# Patient Record
Sex: MALE | Race: WHITE | NOT HISPANIC OR LATINO | Employment: UNEMPLOYED | URBAN - METROPOLITAN AREA
[De-identification: names, ages, dates, MRNs, and addresses within clinical notes are randomized per-mention and may not be internally consistent; named-entity substitution may affect disease eponyms.]

---

## 2018-05-17 VITALS
HEART RATE: 86 BPM | WEIGHT: 238 LBS | BODY MASS INDEX: 38.25 KG/M2 | DIASTOLIC BLOOD PRESSURE: 79 MMHG | HEIGHT: 66 IN | SYSTOLIC BLOOD PRESSURE: 116 MMHG

## 2018-05-17 DIAGNOSIS — G57.32 PERONEAL NEUROPATHY, LEFT: ICD-10-CM

## 2018-05-17 DIAGNOSIS — M54.17 LUMBOSACRAL RADICULOPATHY AT L5: Primary | ICD-10-CM

## 2018-05-17 PROCEDURE — 99204 OFFICE O/P NEW MOD 45 MIN: CPT | Performed by: ORTHOPAEDIC SURGERY

## 2018-05-17 RX ORDER — NORTRIPTYLINE HYDROCHLORIDE 10 MG/1
10 CAPSULE ORAL
COMMUNITY

## 2018-05-17 RX ORDER — DICLOFENAC 35 MG/1
CAPSULE ORAL
Refills: 2 | COMMUNITY
Start: 2018-04-03

## 2018-05-17 RX ORDER — LORATADINE 10 MG/1
CAPSULE, LIQUID FILLED ORAL
COMMUNITY

## 2018-05-17 RX ORDER — GABAPENTIN 300 MG/1
300 CAPSULE ORAL
COMMUNITY

## 2018-05-17 NOTE — PROGRESS NOTES
Assessment:     1  Lumbosacral radiculopathy at L5    2  Peroneal neuropathy, left          Plan:     Problem List Items Addressed This Visit        Nervous and Auditory    Lumbosacral radiculopathy at L5 - Primary     48-year-old male with a herniated L5 disc and symptoms and a EMGs confirming L5 radiculopathy  After reviewing his chart, evaluating him, I think that this is a large portion of his underlying symptoms  I do think it is contributing to a double crush pattern with his peroneal nerve  He has a lot of tightness in his low back and lower extremities and at this point the most important thing is for him to start some physical therapy to work on strengthening, stretching, and low back exercises  I also think it would be worth him considering possible surgical decompression based on what his spine surgeon would recommend  I have referred him to Dr Ralf Little to discuss the possibility as to whether or not this would help him given the fact that his injections have failed to give him any long-term relief  I do think that his gait abnormality is contributing to how severe his back problems are  In my opinion feel that the thing that needs to be addressed the most at this time has the best chance of giving him some long-term relief is addressing his low back and radiculopathy  Relevant Orders    Ambulatory referral to Physical Therapy    Ambulatory referral to Orthopedic Surgery    Peroneal neuropathy, left     48-year-old male peroneal neuropathy on his left leg who is status post decompression and multiple injections  Based on his most recent EMG findings that do not think that a 2nd decompression would give him significant relief at this time  I do think that 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 and his peroneal nerve  Patient ID: Karina Barry is a 51 y o  male      Chief Complaint:  Left lower extremity pain with burning and numbness and tingling and weakness    HPI:  16-year-old male with significant history, multiple surgeries and injuries resulting in fairly severe chronic pain in his left lower extremity  He states that his visit here today is two fold  The 1st as he would like to have a new set of eyes to see what can be done for his pain and 2nd is also to help provide defense for his workmen's comp case  Patient had history of an ACL tear which was treated with an ACL reconstruction in 2000  In 2012 he underwent a knee arthroscopy for what was felt to be a medial meniscus tear, but this showed a negative findings of a meniscal tear intraoperatively, but he was told that he had a loose ACL and needed to have another ACL reconstruction  He when later to have the repeat ACL reconstruction, but at the time of surgery it was felt that it was not necessary  He had a lot of problems with his peroneal nerve following this in in November 2013 had a peroneal nerve decompression  Initially following the peroneal nerve decompression he had improvement in symptoms, but then he began having increasing pain and weakness in that leg  His main complaint today is burning in the lateral aspect of his leg starting from his knee distally going down into the foot  He also has lot of pain on the plantar aspect of his foot and has no neuromas which are being followed by a podiatrist   He notes a progressive foot drop and weakness in his left lower extremity  He has had multiple injections around his peroneal nerve to help with tight tissue, the most recent was in February of 2018  These injections were done under ultrasound per the patient  Most recent one did not give him any relief and he found that his symptoms of the foot drop in the weakness worsened following the injection  This had not happened before  He also notes that with repetitive activity he gets significant progressive weakness  He has had multiple EMG studies    He has seen multiple neurosurgeons, podiatrists, and sports medicine doctors  He also complains of a lot of low-back pain  He notes that he has a fairly abnormal gait  He has been working with his sports medical doctor, Elizabeth Bañuelos, to work on his gait  This has been the person who was been dealing with his peroneal nerve with the injections the most     I reviewed medical records and case summaries from Dr Lawyer Jiménez, EMG results from 2014 as well as 2017, progress notes from Dr Linette Oquendo MD, neurosurgery, treatment notes from Seamus Arthur podiatrist,, letters from his  as well as work and comp paperwork, his independent medical evaluation and notes from Dr Katerine Valencia, podiatrist   Patient's past medical history and surgical history were reviewed  Patient also submitted the patient a pictoral guide of his pain and symptoms which was reviewed  Allergy:  Allergies   Allergen Reactions    Latex        Medications:  all current active meds have been reviewed    Past Medical History:  History reviewed  No pertinent past medical history  Past Surgical History:  Past Surgical History:   Procedure Laterality Date    KNEE SURGERY Left     acl    KNEE SURGERY Left     arthroscopy    NEUROMA EXCISION         Family History:  Family History   Problem Relation Age of Onset    No Known Problems Mother     No Known Problems Father        Social History:  History   Alcohol Use    Yes     History   Drug use: Unknown     History   Smoking Status    Never Smoker   Smokeless Tobacco    Never Used           ROS:  Review of Systems   Constitutional: Positive for unexpected weight change  HENT: Negative  Eyes: Negative  Respiratory: Negative  Cardiovascular: Negative  Gastrointestinal: Negative  Endocrine: Negative  Genitourinary: Negative  Musculoskeletal: Positive for arthralgias and myalgias  Skin: Negative  Allergic/Immunologic: Negative  Neurological: Positive for numbness  Hematological: Negative  Psychiatric/Behavioral: Negative  Objective:  BP Readings from Last 1 Encounters:   05/17/18 116/79      Wt Readings from Last 1 Encounters:   05/17/18 108 kg (238 lb)        BMI:   Estimated body mass index is 38 41 kg/m² as calculated from the following:    Height as of this encounter: 5' 6" (1 676 m)  Weight as of this encounter: 108 kg (238 lb)  EXAM:   Physical Exam   Constitutional: He is oriented to person, place, and time  He appears well-developed and well-nourished  No distress  HENT:   Head: Normocephalic and atraumatic  Eyes: Right eye exhibits no discharge  Left eye exhibits no discharge  Neck: Normal range of motion  Neck supple  No tracheal deviation present  Cardiovascular: Normal rate and regular rhythm  Pulmonary/Chest: Effort normal  No respiratory distress  Abdominal: Soft  He exhibits no distension  There is no tenderness  Musculoskeletal:        Left knee: He exhibits no effusion  Patient has excellent muscle mass noted throughout  There is no atrophy noted of his lower extremities  He walks with a very guarded gait on his left with a short swelling and no follow through on the left  He is able to heel and toe walk, but this causes significant amount of low back pain as well as pain in the ball of his foot of his left foot when he is toe walking  He has significant difficulty doing a tandem walk both with some muscle control and balance  Neurological: He is alert and oriented to person, place, and time  Skin: Skin is warm  He is not diaphoretic  No erythema  Psychiatric: He has a normal mood and affect  His behavior is normal      Left Knee Exam     Tenderness   Left knee tenderness location: Healing scar at the fibular neck with tenderness in the area and a positive Tinel's  Range of Motion   The patient has normal left knee ROM      Tests   Cassandra:  Medial - negative Lateral - negative  Lachman:  Anterior - negative Drawer:       Posterior - negative  Varus: negative  Valgus: negative  Pivot Shift: negative  Patellar Apprehension: negative    Other   Erythema: absent  Sensation: normal  Pulse: present  Swelling: none  Effusion: no effusion present      Back Exam     Tenderness   The patient is experiencing tenderness in the lumbar (No tenderness in the paraspinal muscles, point tenderness in the low lumbar spine midline)  Range of Motion   Extension: normal   Flexion: abnormal   Lateral Bend Right: normal Back lateral bend right: Right lateral been creates more pain in the back than left lateral bend  Lateral Bend Left: normal   Rotation Right: normal Back rotation right: More pain with rotation to the right than to the left  Rotation Left: normal     Muscle Strength   The patient has normal back strength  Tests   Straight leg raise right: negative  Straight leg raise left: positive    Reflexes   Patellar: 1/4  Achilles: 1/4  Babinski's sign: normal     Other   Toe Walk: abnormal  Heel Walk: abnormal  Sensation: normal  Gait: abnormal   Erythema: no back redness    Comments:  No pain with hip range of motion, patient has tight hamstrings noted with pain goes and in the back of the knee as well as in the back with straight leg raise              Radiographs:  I have personally reviewed pertinent films in PACS and my interpretation is L5 disc herniation on MRI  I spent 38 min with the patient today  Greater than 50% of my time was spent in counseling and discussing patient's symptoms, underlying factors, reviewing the imaging with the patient, answering questions

## 2018-05-17 NOTE — ASSESSMENT & PLAN NOTE
65-year-old male with a herniated L5 disc and symptoms and a EMGs confirming L5 radiculopathy  After reviewing his chart, evaluating him, I think that this is a large portion of his underlying symptoms  I do think it is contributing to a double crush pattern with his peroneal nerve  He has a lot of tightness in his low back and lower extremities and at this point the most important thing is for him to start some physical therapy to work on strengthening, stretching, and low back exercises  I also think it would be worth him considering possible surgical decompression based on what his spine surgeon would recommend  I have referred him to Dr Wayne Brown to discuss the possibility as to whether or not this would help him given the fact that his injections have failed to give him any long-term relief  I do think that his gait abnormality is contributing to how severe his back problems are  In my opinion feel that the thing that needs to be addressed the most at this time has the best chance of giving him some long-term relief is addressing his low back and radiculopathy

## 2018-05-17 NOTE — ASSESSMENT & PLAN NOTE
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

## 2018-05-21 ENCOUNTER — TELEPHONE (OUTPATIENT)
Dept: OBGYN CLINIC | Facility: HOSPITAL | Age: 48
End: 2018-05-21

## 2018-05-21 NOTE — TELEPHONE ENCOUNTER
Caller: patient  Call back number: [phone]  Patient's doctor: Dr Zach Agudelo    Patient is asking if he can  the records that he gave to be scanned in   Please advise

## 2018-06-08 ENCOUNTER — TELEPHONE (OUTPATIENT)
Dept: OBGYN CLINIC | Facility: HOSPITAL | Age: 48
End: 2018-06-08

## 2019-06-27 PROCEDURE — 88304 TISSUE EXAM BY PATHOLOGIST: CPT | Performed by: PODIATRIST

## 2019-06-28 ENCOUNTER — LAB REQUISITION (OUTPATIENT)
Dept: LAB | Facility: HOSPITAL | Age: 49
End: 2019-06-28
Payer: COMMERCIAL

## 2019-06-28 DIAGNOSIS — G57.62 LESION OF LEFT PLANTAR NERVE: ICD-10-CM

## 2019-07-01 LAB
CASE RPRT: NORMAL
CLINICAL INFO: NORMAL
PATH REPORT.FINAL DX SPEC: NORMAL
PATH REPORT.GROSS SPEC: NORMAL

## 2019-08-22 ENCOUNTER — OFFICE VISIT (OUTPATIENT)
Dept: ORTHOPEDICS | Facility: CLINIC | Age: 49
End: 2019-08-22
Payer: COMMERCIAL

## 2019-08-22 VITALS — HEIGHT: 66 IN | BODY MASS INDEX: 39.37 KG/M2 | WEIGHT: 245 LBS

## 2019-08-22 DIAGNOSIS — M51.369 LUMBAR DEGENERATIVE DISC DISEASE: Primary | ICD-10-CM

## 2019-08-22 DIAGNOSIS — M54.16 LUMBAR RADICULOPATHY: ICD-10-CM

## 2019-08-22 DIAGNOSIS — M51.26 HNP (HERNIATED NUCLEUS PULPOSUS), LUMBAR: ICD-10-CM

## 2019-08-22 PROCEDURE — 99213 OFFICE O/P EST LOW 20 MIN: CPT | Performed by: ORTHOPAEDIC SURGERY

## 2019-08-22 RX ORDER — DICLOFENAC 35 MG/1
35 CAPSULE ORAL 2 TIMES DAILY
Refills: 0 | COMMUNITY
Start: 2019-07-30

## 2019-08-22 RX ORDER — OMEPRAZOLE 20 MG/1
20 CAPSULE, DELAYED RELEASE ORAL AS NEEDED
COMMUNITY

## 2019-08-22 RX ORDER — NORTRIPTYLINE HYDROCHLORIDE 10 MG/1
10 CAPSULE ORAL NIGHTLY
Refills: 1 | COMMUNITY
Start: 2019-08-08 | End: 2019-10-18

## 2019-08-22 RX ORDER — GABAPENTIN 300 MG/1
300 CAPSULE ORAL 3 TIMES DAILY
Refills: 3 | COMMUNITY
Start: 2019-07-09

## 2019-08-22 NOTE — PROGRESS NOTES
ORTHOSPINE H&P  Admitting Diagnosis:  No admission diagnoses are documented for this encounter.      CHIEF COMPLAINT : 1.  Low back pain  2.  Left buttock pain  3.  Left groin pain  4.  Left leg weakness    HPI :     Patient is a 49 y.o. male who presents with a 5-year history of low back pain left buttock pain tingling numbness left groin pain.    Injections are provided no relief no recent physical therapy is been utilized.    Does note recent foot surgery which is exacerbated his complaints over time.    Pain is reported to be sharp burning shooting aching throbbing and stabbing in nature with the pain diagram supporting his subjective complaints.    Pain is worse with standing sitting and walking.    Reports 70% of his pain is in the lower back 30% is in the leg.    Pain is improved with lying down aggravating factors include coughing sneezing bending forward.    Ineffective treatments have consisted of acupuncture chiropractor injections and physical therapy..     Medical History:   Past Medical History:   Diagnosis Date   • Acid reflux        Surgical History: No past surgical history on file.    Social History:   Social History     Social History Narrative   • No narrative on file       Family History: No family history on file.    Allergies: Monosodium glutamate and Latex       Medication List          Accurate as of 8/22/19  1:08 PM. If you have any questions, ask your nurse or doctor.               CONTINUE taking these medications    gabapentin 300 mg capsule  Commonly known as:  NEURONTIN  TAKE 1 CAPSULE 5 TIMES A DAY     nortriptyline 10 mg capsule  Commonly known as:  PAMELOR  Take 10 mg by mouth nightly.  Dose:  10 mg     omeprazole 20 mg capsule  Commonly known as:  PriLOSEC  Take 20 mg by mouth daily before breakfast.  Dose:  20 mg     ZORVOLEX 35 mg capsule  TAKE ONE CAPSULE BY MOUTH TWO OR THREE TIMES DAILY AFTER MEALS  Generic drug:  diclofenac submicronized          Review of Systems  All other  "systems reviewed and negative except as noted in the HPI.    Objective       Physical Exam : Ht 1.676 m (5' 6\")   Wt 111 kg (245 lb)   BMI 39.54 kg/m²       The patient appears healthy, there stated age, and in no acute distress.  The patient is oriented to person, time, and place.  Gait is nonantalgic, and no assistive device is being utilized for ambulation.  Lumbar spine inspection reveals no tenderness, pain, or muscle spasm.  Range of motion of the lumbar spine is mildly restricted in all planes.  Gross motor testing of the lower extremities from L2-S1 is 5/5 bilaterally with no demonstrable weakness to manual resistance.  Reflexes are 2+ at the patella and Achilles bilaterally.  Pulses are +2 at the dorsalis pedis and posterior tibial region bilaterally.  There is no evidence of any nerve root tension signs in the seated position at 30 and 90° of leg elevation actively and passively bilaterally.  Hip range of motion is full and painless with internal and external rotation.  Knee range of motion is full and painless to extension and flexion.  No distal muscular atrophy was appreciated.      Imaging : Personal review MRI scan of the lumbar spine notes lumbar degenerative facet disease mildly at L3-4 L4-5 with a contained disc protrusion at the L4-L5 level without any clinically relevant nerve root or spinal canal compromise.    MRI report reviewed      IMPRESSION : 1.  Lumbar degenerative disc disease  2.  Lumbar contained disc herniation L4-L5   3.  Lumbar radiculopathy    PLAN : I have discussed with the patient that her clinical complaints, physical examination findings, as well as my personal interpretation of the diagnostic studies.  We specifically discussed the natural history, current treatment options, current trend and treatments, surgical, nonsurgical, as well as minimally invasive treatment options specific to their complaints and underlying spinal related diagnosis.    Most importantly, I had a " detailed discussion regarding the outcomes of spine surgery when dealing with the specific structural diagnosis and clinical symptoms presented today.    I have also emphasized the importance of being evaluated by a non-spine surgeon particularly their primary care provider to evaluate for non-spinal causes of their current complaints.    Additional clinical follow-up will be coordinated as well as additional diagnostic imaging will be obtained if indicated.  As I have explained, additional testing and perhaps if warranted clinical follow-up may alter the nonsurgical and/or surgical treatment course and algorithm.    The outcomes of nonsurgical and surgical treatment as they relate to lumbar spine axial pain complaints and symptomatology were detailed.  Generally speaking, I discussed the typically poor and unpredictable results following spinal reconstructive surgery.  In fact, I discussed the controversial nature of reconstructing a lumbar spine with a primary and principal complaint of axial pain.  I have outlined my personal preference when dealing with this symptomatology including a focused effort on rehabilitation, spinal flexibility, core strengthening, cardiovascular rehabilitation, avoidance of narcotic utilization, as well as avoiding the over utilization of minimally invasive treatment options that typically have an unpredictable clinical response.  In addition, I outlined the statistics in the spine literature pertaining to a chance of some degree of subjective benefit following lumbar fusion surgery.  If surgical intervention is contemplated, I have advised a strong understanding of the outcomes, risks, future complications, as well as the medical and functional implications.  In simplistic terms, I compared the outcomes of lumbar spine surgery when dealing with axial pain when compared to leg pain from a presenting and presurgical symptom standpoint.    If indicated, appropriate clinical follow-up  will be coordinated through my office.        Phoenix Thomas MD  8/22/2019  1:08 PM

## 2019-10-21 ENCOUNTER — ANESTHESIA EVENT (OUTPATIENT)
Dept: OPERATING ROOM | Facility: HOSPITAL | Age: 49
Setting detail: HOSPITAL OUTPATIENT SURGERY
End: 2019-10-21
Payer: COMMERCIAL

## 2019-10-30 ENCOUNTER — APPOINTMENT (OUTPATIENT)
Dept: RADIOLOGY | Facility: HOSPITAL | Age: 49
Setting detail: HOSPITAL OUTPATIENT SURGERY
End: 2019-10-30
Attending: ORTHOPAEDIC SURGERY
Payer: COMMERCIAL

## 2019-10-30 ENCOUNTER — ANESTHESIA (OUTPATIENT)
Dept: OPERATING ROOM | Facility: HOSPITAL | Age: 49
Setting detail: HOSPITAL OUTPATIENT SURGERY
End: 2019-10-30
Payer: COMMERCIAL

## 2019-10-30 ENCOUNTER — HOSPITAL ENCOUNTER (OUTPATIENT)
Facility: HOSPITAL | Age: 49
Setting detail: HOSPITAL OUTPATIENT SURGERY
Discharge: HOME | End: 2019-10-30
Attending: ORTHOPAEDIC SURGERY | Admitting: ORTHOPAEDIC SURGERY
Payer: COMMERCIAL

## 2019-10-30 VITALS
RESPIRATION RATE: 15 BRPM | HEART RATE: 72 BPM | WEIGHT: 229 LBS | HEIGHT: 66 IN | DIASTOLIC BLOOD PRESSURE: 58 MMHG | OXYGEN SATURATION: 93 % | BODY MASS INDEX: 36.8 KG/M2 | SYSTOLIC BLOOD PRESSURE: 116 MMHG | TEMPERATURE: 97 F

## 2019-10-30 PROCEDURE — 71000012 HC PACU PHASE 2 EA ADDL MIN: Performed by: ORTHOPAEDIC SURGERY

## 2019-10-30 PROCEDURE — 71000001 HC PACU PHASE 1 INITIAL 30MIN: Performed by: ORTHOPAEDIC SURGERY

## 2019-10-30 PROCEDURE — 63600000 HC DRUGS/DETAIL CODE: Performed by: NURSE ANESTHETIST, CERTIFIED REGISTERED

## 2019-10-30 PROCEDURE — 63600000 HC DRUGS/DETAIL CODE: Performed by: ANESTHESIOLOGY

## 2019-10-30 PROCEDURE — 36000014 HC OR LEVEL 4 EA ADDL MIN: Performed by: ORTHOPAEDIC SURGERY

## 2019-10-30 PROCEDURE — 25000000 HC PHARMACY GENERAL: Performed by: ORTHOPAEDIC SURGERY

## 2019-10-30 PROCEDURE — 63600000 HC DRUGS/DETAIL CODE: Performed by: ORTHOPAEDIC SURGERY

## 2019-10-30 PROCEDURE — 27200000 HC STERILE SUPPLY: Performed by: ORTHOPAEDIC SURGERY

## 2019-10-30 PROCEDURE — 71000011 HC PACU PHASE 1 EA ADDL MIN: Performed by: ORTHOPAEDIC SURGERY

## 2019-10-30 PROCEDURE — 71000002 HC PACU PHASE 2 INITIAL 30MIN: Performed by: ORTHOPAEDIC SURGERY

## 2019-10-30 PROCEDURE — 01NB0ZZ RELEASE LUMBAR NERVE, OPEN APPROACH: ICD-10-PCS | Performed by: ORTHOPAEDIC SURGERY

## 2019-10-30 PROCEDURE — 0ST20ZZ RESECTION OF LUMBAR VERTEBRAL DISC, OPEN APPROACH: ICD-10-PCS | Performed by: ORTHOPAEDIC SURGERY

## 2019-10-30 PROCEDURE — 25000000 HC PHARMACY GENERAL: Performed by: NURSE ANESTHETIST, CERTIFIED REGISTERED

## 2019-10-30 PROCEDURE — 25800000 HC PHARMACY IV SOLUTIONS: Performed by: ORTHOPAEDIC SURGERY

## 2019-10-30 PROCEDURE — 36000004 HC OR LEVEL 4 INITIAL 30MIN: Performed by: ORTHOPAEDIC SURGERY

## 2019-10-30 PROCEDURE — 72020 X-RAY EXAM OF SPINE 1 VIEW: CPT

## 2019-10-30 PROCEDURE — 63700000 HC SELF-ADMINISTRABLE DRUG: Performed by: STUDENT IN AN ORGANIZED HEALTH CARE EDUCATION/TRAINING PROGRAM

## 2019-10-30 PROCEDURE — 37000001 HC ANESTHESIA GENERAL: Performed by: ORTHOPAEDIC SURGERY

## 2019-10-30 RX ORDER — ROCURONIUM BROMIDE 10 MG/ML
INJECTION, SOLUTION INTRAVENOUS AS NEEDED
Status: DISCONTINUED | OUTPATIENT
Start: 2019-10-30 | End: 2019-10-30 | Stop reason: SURG

## 2019-10-30 RX ORDER — PROPOFOL 10 MG/ML
INJECTION, EMULSION INTRAVENOUS AS NEEDED
Status: DISCONTINUED | OUTPATIENT
Start: 2019-10-30 | End: 2019-10-30 | Stop reason: SURG

## 2019-10-30 RX ORDER — BUPIVACAINE HYDROCHLORIDE 2.5 MG/ML
INJECTION, SOLUTION EPIDURAL; INFILTRATION; INTRACAUDAL AS NEEDED
Status: DISCONTINUED | OUTPATIENT
Start: 2019-10-30 | End: 2019-10-30 | Stop reason: HOSPADM

## 2019-10-30 RX ORDER — VANCOMYCIN HYDROCHLORIDE 1 G/20ML
INJECTION, POWDER, LYOPHILIZED, FOR SOLUTION INTRAVENOUS AS NEEDED
Status: DISCONTINUED | OUTPATIENT
Start: 2019-10-30 | End: 2019-10-30 | Stop reason: HOSPADM

## 2019-10-30 RX ORDER — CEFAZOLIN SODIUM/WATER 2 G/20 ML
2 SYRINGE (ML) INTRAVENOUS ONCE
Status: COMPLETED | OUTPATIENT
Start: 2019-10-30 | End: 2019-10-30

## 2019-10-30 RX ORDER — DEXAMETHASONE SODIUM PHOSPHATE 4 MG/ML
INJECTION, SOLUTION INTRA-ARTICULAR; INTRALESIONAL; INTRAMUSCULAR; INTRAVENOUS; SOFT TISSUE AS NEEDED
Status: DISCONTINUED | OUTPATIENT
Start: 2019-10-30 | End: 2019-10-30 | Stop reason: SURG

## 2019-10-30 RX ORDER — OXYCODONE HYDROCHLORIDE 5 MG/1
5 TABLET ORAL EVERY 6 HOURS PRN
Status: DISCONTINUED | OUTPATIENT
Start: 2019-10-30 | End: 2019-10-30

## 2019-10-30 RX ORDER — ROCURONIUM BROMIDE 50 MG/5 ML
SYRINGE (ML) INTRAVENOUS AS NEEDED
Status: DISCONTINUED | OUTPATIENT
Start: 2019-10-30 | End: 2019-10-30 | Stop reason: SURG

## 2019-10-30 RX ORDER — FENTANYL CITRATE 50 UG/ML
50 INJECTION, SOLUTION INTRAMUSCULAR; INTRAVENOUS
Status: DISCONTINUED | OUTPATIENT
Start: 2019-10-30 | End: 2019-10-30 | Stop reason: HOSPADM

## 2019-10-30 RX ORDER — METHYLPREDNISOLONE 4 MG/1
4 TABLET ORAL SEE ADMIN INSTRUCTIONS
Qty: 21 TABLET | Refills: 0 | Status: SHIPPED | OUTPATIENT
Start: 2019-10-30 | End: 2019-11-06

## 2019-10-30 RX ORDER — MEPERIDINE HYDROCHLORIDE 25 MG/ML
12.5 INJECTION INTRAMUSCULAR; INTRAVENOUS; SUBCUTANEOUS EVERY 10 MIN PRN
Status: DISCONTINUED | OUTPATIENT
Start: 2019-10-30 | End: 2019-10-30 | Stop reason: HOSPADM

## 2019-10-30 RX ORDER — MIDAZOLAM HYDROCHLORIDE 2 MG/2ML
INJECTION, SOLUTION INTRAMUSCULAR; INTRAVENOUS AS NEEDED
Status: DISCONTINUED | OUTPATIENT
Start: 2019-10-30 | End: 2019-10-30 | Stop reason: SURG

## 2019-10-30 RX ORDER — ONDANSETRON HYDROCHLORIDE 2 MG/ML
4 INJECTION, SOLUTION INTRAVENOUS
Status: DISCONTINUED | OUTPATIENT
Start: 2019-10-30 | End: 2019-10-30 | Stop reason: HOSPADM

## 2019-10-30 RX ORDER — SODIUM CHLORIDE 9 MG/ML
INJECTION, SOLUTION INTRAVENOUS CONTINUOUS
Status: DISCONTINUED | OUTPATIENT
Start: 2019-10-30 | End: 2019-10-30 | Stop reason: HOSPADM

## 2019-10-30 RX ORDER — VANCOMYCIN/0.9 % SOD CHLORIDE 1.5G/250ML
1500 PLASTIC BAG, INJECTION (ML) INTRAVENOUS ONCE
Status: COMPLETED | OUTPATIENT
Start: 2019-10-30 | End: 2019-10-30

## 2019-10-30 RX ORDER — EPHEDRINE SULFATE 50 MG/ML
INJECTION, SOLUTION INTRAVENOUS AS NEEDED
Status: DISCONTINUED | OUTPATIENT
Start: 2019-10-30 | End: 2019-10-30 | Stop reason: SURG

## 2019-10-30 RX ORDER — ACETAMINOPHEN 500 MG/1
500 TABLET, FILM COATED ORAL EVERY 4 HOURS PRN
Qty: 40 TABLET | Refills: 0 | Status: SHIPPED | OUTPATIENT
Start: 2019-10-30 | End: 2019-11-29

## 2019-10-30 RX ORDER — ACETAMINOPHEN 325 MG/1
650 TABLET ORAL EVERY 4 HOURS PRN
Status: DISCONTINUED | OUTPATIENT
Start: 2019-10-30 | End: 2019-10-30 | Stop reason: HOSPADM

## 2019-10-30 RX ORDER — LIDOCAINE HYDROCHLORIDE 10 MG/ML
INJECTION, SOLUTION INFILTRATION; PERINEURAL AS NEEDED
Status: DISCONTINUED | OUTPATIENT
Start: 2019-10-30 | End: 2019-10-30 | Stop reason: SURG

## 2019-10-30 RX ORDER — OXYCODONE HYDROCHLORIDE 5 MG/1
10 TABLET ORAL EVERY 6 HOURS PRN
Status: DISCONTINUED | OUTPATIENT
Start: 2019-10-30 | End: 2019-10-30

## 2019-10-30 RX ORDER — HYDROMORPHONE HYDROCHLORIDE 1 MG/ML
0.5 INJECTION, SOLUTION INTRAMUSCULAR; INTRAVENOUS; SUBCUTANEOUS
Status: DISCONTINUED | OUTPATIENT
Start: 2019-10-30 | End: 2019-10-30 | Stop reason: HOSPADM

## 2019-10-30 RX ORDER — ONDANSETRON HYDROCHLORIDE 2 MG/ML
INJECTION, SOLUTION INTRAVENOUS AS NEEDED
Status: DISCONTINUED | OUTPATIENT
Start: 2019-10-30 | End: 2019-10-30 | Stop reason: SURG

## 2019-10-30 RX ORDER — FENTANYL CITRATE 50 UG/ML
INJECTION, SOLUTION INTRAMUSCULAR; INTRAVENOUS AS NEEDED
Status: DISCONTINUED | OUTPATIENT
Start: 2019-10-30 | End: 2019-10-30 | Stop reason: SURG

## 2019-10-30 RX ADMIN — EPHEDRINE SULFATE 10 MG: 50 INJECTION INTRAVENOUS at 06:50

## 2019-10-30 RX ADMIN — FENTANYL CITRATE 50 MCG: 50 INJECTION, SOLUTION INTRAMUSCULAR; INTRAVENOUS at 08:55

## 2019-10-30 RX ADMIN — DEXAMETHASONE SODIUM PHOSPHATE 8 MG: 4 INJECTION, SOLUTION INTRAMUSCULAR; INTRAVENOUS at 07:21

## 2019-10-30 RX ADMIN — ROCURONIUM BROMIDE 50 MG: 10 INJECTION INTRAVENOUS at 06:32

## 2019-10-30 RX ADMIN — Medication 20 MG: at 07:13

## 2019-10-30 RX ADMIN — PROPOFOL 200 MG: 10 INJECTION, EMULSION INTRAVENOUS at 06:32

## 2019-10-30 RX ADMIN — ACETAMINOPHEN 650 MG: 325 TABLET, FILM COATED ORAL at 11:14

## 2019-10-30 RX ADMIN — FENTANYL CITRATE 50 MCG: 50 INJECTION, SOLUTION INTRAMUSCULAR; INTRAVENOUS at 08:40

## 2019-10-30 RX ADMIN — Medication 2 G: at 06:37

## 2019-10-30 RX ADMIN — MIDAZOLAM HYDROCHLORIDE 2 MG: 1 INJECTION, SOLUTION INTRAMUSCULAR; INTRAVENOUS at 06:29

## 2019-10-30 RX ADMIN — EPHEDRINE SULFATE 10 MG: 50 INJECTION INTRAVENOUS at 07:08

## 2019-10-30 RX ADMIN — SUGAMMADEX 200 MG: 100 INJECTION, SOLUTION INTRAVENOUS at 07:50

## 2019-10-30 RX ADMIN — SODIUM CHLORIDE: 9 INJECTION, SOLUTION INTRAVENOUS at 05:52

## 2019-10-30 RX ADMIN — VANCOMYCIN HYDROCHLORIDE 1500 MG: 1 INJECTION, POWDER, LYOPHILIZED, FOR SOLUTION INTRAVENOUS at 05:53

## 2019-10-30 RX ADMIN — ONDANSETRON 4 MG: 2 INJECTION INTRAMUSCULAR; INTRAVENOUS at 06:29

## 2019-10-30 RX ADMIN — LIDOCAINE HYDROCHLORIDE 5 ML: 10 INJECTION, SOLUTION INFILTRATION; PERINEURAL at 06:32

## 2019-10-30 RX ADMIN — FENTANYL CITRATE 50 MCG: 50 INJECTION INTRAMUSCULAR; INTRAVENOUS at 06:29

## 2019-10-30 ASSESSMENT — PAIN SCALES - GENERAL: PAIN_LEVEL: 6

## 2019-10-30 ASSESSMENT — PAIN - FUNCTIONAL ASSESSMENT: PAIN_FUNCTIONAL_ASSESSMENT: 0-10

## 2019-10-30 NOTE — ANESTHESIA POSTPROCEDURE EVALUATION
Patient: Al Gaytan    Procedure Summary     Date:  10/30/19 Room / Location:   OR 9 /  OR    Anesthesia Start:  0630 Anesthesia Stop:  0816    Procedure:  L4-5 Posterior Lumbar Decompression Discectomy (N/A Back) Diagnosis:       Herniated lumbar disc without myelopathy      Neuritis      Radiculitis      (HNP w/o Myelopathy M51.26)      (Neuritis/Radiculitis NEC M54.16)    Surgeon:  Clarence Kelly MD Responsible Provider:  Andrea Amado MD    Anesthesia Type:  general ASA Status:  2          Anesthesia Type: general  PACU Vitals  10/30/2019 0813 - 10/30/2019 0850      10/30/2019  0825 10/30/2019  0830          BP:  121/64  126/64      Temp:  36.3 °C (97.4 °F)  --      Pulse:  72  72      Resp:  20  13      SpO2:  96 %  96 %              Anesthesia Post Evaluation    Pain score: 6  Pain management: adequate  Patient location during evaluation: PACU  Patient participation: complete - patient participated  Level of consciousness: awake and alert  Cardiovascular status: acceptable  Airway Patency: adequate  Respiratory status: acceptable  Hydration status: acceptable  Anesthetic complications: no

## 2019-10-30 NOTE — OP NOTE
REPORT TYPE:  Operative Note    DATE OF OPERATION:  10/30/2019      PREOPERATIVE DIAGNOSIS:  Left-sided L4-L5 disk herniation, stenosis, radiculopathy.    POSTOPERATIVE DIAGNOSIS:  Left-sided L4-L5 disk herniation, stenosis, radiculopathy.    PROCEDURE PERFORMED:  Left side L4-L5 laminoforaminotomy and diskectomy.    SURGEON:  Chirag Kelly MD.    ASSISTANT:  Corinna Mccain.    ANESTHESIA:  General endotracheal anesthesia.    COMPLICATIONS:  None.    SPECIMENS:  None.    INDICATIONS:  For complete discussion of indications for procedure as well as discussion I had with the patient in my office regarding risks, benefits and alternative treatment, please refer to my office notes and the consent forms, which were signed.    DESCRIPTION OF PROCEDURE:  The patient was identified in the holding area.  Operative site was marked.  The patient was taken to the operating room.  General anesthesia was administered.  The patient was then prepped and draped in the prone position on   Jed table.  All bony and soft tissue protuberances were well padded throughout the duration of procedure.  Throughout the procedure, the patient had SCDs on his bilateral lower extremities.  Prior to skin incision, intravenous antibiotics were   administered and a formal timeout was performed.  At the end of the procedure, sponge and needle counts were correct x2.    After prepping and draping, an incision was made posteriorly over the L4-L5 level of the midline.  Dissection was carried down through subcutaneous tissue down to the level of the deep fascia.  Deep fascia was incised with electrocautery.  In   subperiosteal fashion, the left sided posterior elements of L4-L5 were exposed.  Intraoperative x-ray was taken to confirm level.  Following this, soft tissue retractors were placed.  A 3 mm Kerrison was used to remove ligamentum flavum and perform   laminotomy removing a small amount of bone from the superior aspect of the left L5 lamina and  inferior aspect of left L4 lamina.  A lateral recess decompression was performed using a 3 mm Kerrison rongeur removing ligamentum flavum and performing a   partial medial facetectomy on the left side L4-L5.  A foraminotomy was performed on the left side L4-L5 using a 3 mm Kerrison rongeur.  A traversing L5 nerve root was gently tapped medially.  There was a large extruded disk herniation present compressing   L5 nerve root.  This was removed using a straight pituitary rongeur.  A Chinmay was used to press the remainder of the posterior annulus and a few additional loose disk fragments were removed through the annular defect.  Following this, the   decompression diskectomy was felt to be complete.  It was confirmed with a Orange, which was passed out the foramen on the left side at L4-L5 and along lateral recess at L4-L5 on the left side.  There was no remaining stenosis or compression noted.  The   exiting L4 nerve root and traversing L5 nerve root were fully decompressed.  Hemostasis was obtained using electrocautery, bipolar and FloSeal.  On the Valsalva maneuver, there was no significant bleeding noted and no evidence of any spinal fluid   leakage.  The wound was copiously irrigated with antibiotic-impregnated solution.  Hemostasis was felt to be adequate.  A deep fascia was closed in interrupted fashion.  Subcutaneous was closed in interrupted fashion.  The skin was closed in running   fashion followed by dry dressing and tape.  The patient was turned supine on the hospital bed, extubated and transferred to the PACU in stable condition.  There were no complications.      MARIANO SPARKS MD        CC:     DD: 10/30/2019 16:29  DT: 10/30/2019 19:04  Voice ID: [id number]/Report ID: [id number]  [facility]

## 2019-10-30 NOTE — OR SURGEON
Pre-Procedure patient identification:  I am the primary operating surgeon/proceduralist and I have identified the patient on 10/30/19 at 6:17 AM Clarence Kelly MD  Phone Number: [phone]

## 2019-10-30 NOTE — DISCHARGE INSTRUCTIONS
General Anesthesia, Adult, Care After  This sheet gives you information about how to care for yourself after your procedure. Your health care provider may also give you more specific instructions. If you have problems or questions, contact your health care provider.  What can I expect after the procedure?  After the procedure, the following side effects are common:  · Pain or discomfort at the IV site.  · Nausea.  · Vomiting.  · Sore throat.  · Trouble concentrating.  · Feeling cold or chills.  · Weak or tired.  · Sleepiness and fatigue.  · Soreness and body aches. These side effects can affect parts of the body that were not involved in surgery.  Follow these instructions at home:    For at least 24 hours after the procedure:  · Have a responsible adult stay with you. It is important to have someone help care for you until you are awake and alert.  · Rest as needed.  · Do not:  ? Participate in activities in which you could fall or become injured.  ? Drive.  ? Use heavy machinery.  ? Drink alcohol.  ? Take sleeping pills or medicines that cause drowsiness.  ? Make important decisions or sign legal documents.  ? Take care of children on your own.  Eating and drinking  · Follow any instructions from your health care provider about eating or drinking restrictions.  · When you feel hungry, start by eating small amounts of foods that are soft and easy to digest (bland), such as toast. Gradually return to your regular diet.  · Drink enough fluid to keep your urine pale yellow.  · If you vomit, rehydrate by drinking water, juice, or clear broth.  General instructions  · If you have sleep apnea, surgery and certain medicines can increase your risk for breathing problems. Follow instructions from your health care provider about wearing your sleep device:  ? Anytime you are sleeping, including during daytime naps.  ? While taking prescription pain medicines, sleeping medicines, or medicines that make you drowsy.  · Return to  your normal activities as told by your health care provider. Ask your health care provider what activities are safe for you.  · Take over-the-counter and prescription medicines only as told by your health care provider.  · If you smoke, do not smoke without supervision.  · Keep all follow-up visits as told by your health care provider. This is important.  Contact a health care provider if:  · You have nausea or vomiting that does not get better with medicine.  · You cannot eat or drink without vomiting.  · You have pain that does not get better with medicine.  · You are unable to pass urine.  · You develop a skin rash.  · You have a fever.  · You have redness around your IV site that gets worse.  Get help right away if:  · You have difficulty breathing.  · You have chest pain.  · You have blood in your urine or stool, or you vomit blood.  Summary  · After the procedure, it is common to have a sore throat or nausea. It is also common to feel tired.  · Have a responsible adult stay with you for the first 24 hours after general anesthesia. It is important to have someone help care for you until you are awake and alert.  · When you feel hungry, start by eating small amounts of foods that are soft and easy to digest (bland), such as toast. Gradually return to your regular diet.  · Drink enough fluid to keep your urine pale yellow.  · Return to your normal activities as told by your health care provider. Ask your health care provider what activities are safe for you.  This information is not intended to replace advice given to you by your health care provider. Make sure you discuss any questions you have with your health care provider.  Document Released: 03/26/2002 Document Revised: 08/03/2018 Document Reviewed: 08/03/2018  [email or web address] Interactive Patient Education © 2019 [employer]

## 2019-10-30 NOTE — ANESTHESIA PROCEDURE NOTES
Airway  Urgency: elective    Start Time: 10/30/2019 6:37 AM  Airway not difficult    General Information and Staff    Patient location during procedure: OR  Anesthesiologist: Andrea Amado MD  Resident/CRNA: Emily Swenson CRNA  Performed: resident/CRNA     Indications and Patient Condition  Indications for airway management: anesthesia  Sedation level: general  Preoxygenated: yes  Patient position: sniffing  Mask difficulty assessment: 1 - vent by mask    Final Airway Details  Final airway type: endotracheal airway      Successful airway: ETT  Cuffed: yes   Successful intubation technique: direct laryngoscopy  Endotracheal tube insertion site: oral  Blade: Dalton  Blade size: #2  ETT size (mm): 7.5  Cormack-Lehane Classification: grade IIa - partial view of glottis  Placement verified by: chest auscultation and capnometry   Cuff volume (mL): 8  Measured from: lips  ETT to lips (cm): 23  Number of attempts at approach: 1  Ventilation between attempts: none  Number of other approaches attempted: 0  Atraumatic airway insertion

## 2021-03-16 ENCOUNTER — TELEPHONE (OUTPATIENT)
Dept: NEUROSURGERY | Facility: CLINIC | Age: 51
End: 2021-03-16

## 2021-03-16 DIAGNOSIS — M51.369 LUMBAR DEGENERATIVE DISC DISEASE: Primary | ICD-10-CM

## 2021-03-16 NOTE — TELEPHONE ENCOUNTER
New Patient  Referring to Dr Mari      Referring Provider: Dr Cardenas       PCP: Dr Jordan     Most Recent Studies:    MRI: Yes L-Spine at [facility] (08/12/2020)      Xrays: No      Dexa: No      EMG: Yes at [facility] (10/23/2020)      Onset of Symptoms & Reason for Visit: Patient states they had surgery in 2019 which failed and they are still experiencing pain. Lower back pain. Tingling and burning left side. Numbness in legs down to foot and weakness in feet.         Physical Therapy: No      Pain Management: Yes at [facility]      Previous Surgery: Yes in 2019 with Dr Kelly         Insurance: [employer]        W/C or Auto: No

## 2021-03-17 NOTE — TELEPHONE ENCOUNTER
Kristin scheduled patient for 4/1 at 11am. Emailing Np paperwork and scripts to [email or web address]

## 2021-04-01 ENCOUNTER — OFFICE VISIT (OUTPATIENT)
Dept: NEUROSURGERY | Facility: CLINIC | Age: 51
End: 2021-04-01
Payer: COMMERCIAL

## 2021-04-01 VITALS
WEIGHT: 250 LBS | SYSTOLIC BLOOD PRESSURE: 147 MMHG | HEIGHT: 66 IN | BODY MASS INDEX: 40.18 KG/M2 | TEMPERATURE: 97.8 F | RESPIRATION RATE: 18 BRPM | HEART RATE: 83 BPM | OXYGEN SATURATION: 98 % | DIASTOLIC BLOOD PRESSURE: 86 MMHG

## 2021-04-01 DIAGNOSIS — M21.372 FOOT DROP, LEFT: Primary | ICD-10-CM

## 2021-04-01 DIAGNOSIS — M46.1 ARTHRITIS OF LEFT SACROILIAC JOINT (CMS/HCC): ICD-10-CM

## 2021-04-01 PROCEDURE — 99205 OFFICE O/P NEW HI 60 MIN: CPT | Performed by: NEUROLOGICAL SURGERY

## 2021-04-01 PROCEDURE — 3008F BODY MASS INDEX DOCD: CPT | Performed by: NEUROLOGICAL SURGERY

## 2021-04-01 RX ORDER — DICLOFENAC POTASSIUM 25 MG/1
CAPSULE, LIQUID FILLED ORAL
COMMUNITY
Start: 2021-03-25

## 2021-04-01 NOTE — PROGRESS NOTES
Dear Dr. Jordan,    I had the pleasure of meeting a patient of yours in the office today.  Thank you for your kind referral.  As you may recall, Mr. Al Gaytan is a pleasant 51-year-old gentleman who presents with severe lower left-sided back pain and significant pain, paresthesias, and weakness in his left lower extremity.  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  He reports that he has had chronic pain in the distal left lower extremity for many years.  His past medical history of left knee arthroscopy x2 in 2012 which precipitated the onset of dysesthetic pain, numbness, and paresthesias in the left anterior lateral shin radiating into the dorsum of his left foot.  He has undergone 2 left common peroneal nerve decompressions after his knee procedure in 2013 in 2018.  Unfortunately he developed left-sided foot drop as well as weakness in plantar flexion of his left foot and ambulatory dysfunction.  His imaging findings from 2019 had revealed left-sided lateral recess stenosis at L4-5 causing compression of the traversing L5 nerve root.  Due to the weakness and severe pain in his left leg he underwent left-sided hemilaminotomies medial facetectomies at L4-5 and L5-S1 in 2019.  After this procedure he did not have improvement of his pain or improvement of foot drop.     Concomitantly, he has had left-sided lower back pain which radiates into the buttock and also down the posterior aspect of the right thigh.  The pain is focal to the left SI joint area and reproducible with palpation.  Pain radiating from the left sacroiliac joint is associated with tingling and numbness. Seated positions, transitional maneuvers from seated to standing, and ambulating exacerbate the lower back pain.  In a completely flexed position while seated he will have numbness in his left leg.  He walks with a limp, has had frequent falls due to weakness in his left foot and has recently  suffered a metatarsal fracture.  He has not been fitted for MAFO bracing in the past.       I have reviewed his records from Dr. Lobato of pain management and the op report from Dr. Kelly.  He has undergone multiple transforaminal epidural steroid injections on the left at L3-4, L4-5, and L5-S1.  He has also undergone medial branch blocks with maximum 25% improvement of pain which was not long-lasting.  Due to the severity of his symptoms, neurologic deficits, failure to improve after surgical and conservative measures, and interruption in the activities of his daily living, he was referred for a neurosurgical opinion.    Imaging: I personally reviewed his lumbar MRI studies and ordered him x-rays with dynamic views.  He has fairly well-preserved lumbar lordosis and there is no instability on dynamic views.  He does have spondylosis with 40% loss of disc space height at L4-5 and L5-S1.  He has a left-sided L4-5 hemilaminotomy and medial facetectomy and there is scar tissue around the L5 nerve root but I do not see any ongoing compression.  He has a central annular tear and bulge at L5-S1 but the S1 nerve root is not compressed in the lateral recess.            L4-5      L5-S1      Review of Systems: 14 point review of systems are negative except for the following pertinent positives: Weight gain, sensitivity to light, lumbar degenerative disc disease, back pain, neuropathy, headaches, joint pain, stiffness, disturbance in gait, numbness, and weakness.    Medical History:   Past Medical History:   Diagnosis Date   • Acid reflux    • HNP (herniated nucleus pulposus), lumbar    • Lumbar neuritis    • Lumbar radiculitis    • Migraine     occasional stress related    • Neuropathy     lower left extremity   • Pneumonia      x 2 when younger   • Sciatica     left side   • Seasonal allergies     spring and fall   • Snores        Surgical History:   Past Surgical History:   Procedure Laterality Date   • FOOT SURGERY Bilateral     • KNEE SURGERY Left     multiple       Social History:   Social History     Socioeconomic History   • Marital status:      Spouse name: None   • Number of children: None   • Years of education: None   • Highest education level: None   Occupational History   • None   Social Needs   • Financial resource strain: None   • Food insecurity     Worry: None     Inability: None   • Transportation needs     Medical: None     Non-medical: None   Tobacco Use   • Smoking status: Never Smoker   • Smokeless tobacco: Never Used   Substance and Sexual Activity   • Alcohol use: Yes     Comment: rarely   • Drug use: Never   • Sexual activity: Defer   Lifestyle   • Physical activity     Days per week: None     Minutes per session: None   • Stress: None   Relationships   • Social connections     Talks on phone: None     Gets together: None     Attends [employer] service: None     Active member of club or organization: None     Attends meetings of clubs or organizations: None     Relationship status: None   • Intimate partner violence     Fear of current or ex partner: None     Emotionally abused: None     Physically abused: None     Forced sexual activity: None   Other Topics Concern   • None   Social History Narrative   • None       Family History: No family history on file.    Allergies: Monosodium glutamate, Oxycodone, and Latex    Current Outpatient Medications:   •  gabapentin (NEURONTIN) 300 mg capsule, Take 300 mg by mouth 3 (three) times a day.  , Disp: , Rfl: 3  •  omeprazole (PriLOSEC) 20 mg capsule, Take 20 mg by mouth as needed.  , Disp: , Rfl:   •  ZIPSOR 25 mg capsule, TAKE 2 CAPSULES BY MOUTH TWICE DAILY AFTER MEALS, Disp: , Rfl:   •  ZORVOLEX 35 mg capsule, 35 mg 2 (two) times a day.  , Disp: , Rfl: 0      Physicial Exam    Vital Signs   Vitals:    04/01/21 1108   BP: (!) 147/86   Pulse: 83   Resp: 18   Temp: 36.6 °C (97.8 °F)   SpO2: 98%       Awake, alert, oriented to person, place, time and situation; speech  and fund of knowledge normal.  Neck is supple w/ FROM, is nontender and no JVD. Chest CTA without rales. Heart has a regular rate and rhythm without murmur. Abdomen soft, NT, ND, + BS.     Neurological examination:    PERRL, extra-ocular movements intact, face symmetric, tongue protrudes to midline, no nystagmus or diplopia.  Motor:     RUE:  D  5/5, B  5/5, T 5/5, WE 5/5, HG 5/5, IH 5/5   LUE:  D  5/5, B  5/5, T 5/5, WE 5/5, HG 5/5, IH 5/5   RLE:  IP  5/5, Q  5/5, DF 5/5, EHL 5/5, PF 5/5   LLE:  IP  5/5, Q  4/5 (PL), DF 2/5, EHL 4-/5, PF 3/5  +Ecchymosis to 3/4th metatarsals with acute fracture  Reflexes:  Normoreflexive, no Bess's or Babinski signs, no clonus  Sensory exam:  Intact to light touch, pain, temperature and JPS testing  Gait and posture normal. He is unable to fully weight bear on LLE 2/2 to pain.   +Tenderness across the left SI joint with +provocative testing to Bakari and thigh thrust maneuvers  No dysmetria.      Assessment/Plan     This is a 51-year-old gentleman who has had a prior left L4-5 hemilaminotomy and 2 common peroneal nerve releases.  He has chronic left foot drop, left-sided lower back and buttock pain and pain in the left leg, distal to the knee.    1) Left sacro-iliac joint dysfunction: The patient has a severe gait abnormality due to longstanding left knee and peroneal neuropathy issues.  This certainly could predispose him to hip and sacroiliac joint dysfunction in addition to throwing off the alignment in his lower back.  Thus, his left-sided lower back pain is likely multifactorial.  However, he has provocative testing consistent with sacroiliac joint dysfunction.    Pain in the lumbosacral and sacroiliac regions can have multiple etiologies.  Patient has pain over the bilateral sacroiliac joints and certainly may have a component of sacroiliac joint arthritis and dysfunction.  Imaging findings have not been shown to correlate with sacroiliac joint symptoms.  The  diagnostic/therapeutic procedure of choice is a sacroiliac injection with confirmatory arthrography using an anesthetic with or without steroids.  I stressed to the patient that it is critical to document their VAS pain score doing there is activities in the days or week prior to the injection.  The patient should then focus on the 6 hours after the procedure while the anesthetic medication is working.  The steroids may or may not have a delayed effect (therapeutic aspect of the injection) but the diagnostic portion of the injection is in the first 6 hours while the joint is anesthetized.  If they experience significant relief (even if temporary), this helps identify the sacroiliac joint as at least 1 of the dominant pain generators.  The patient could be a candidate for repeat injections or she may be a candidate for sacroiliac joint ablation  (radiofrequency ablation).  There are also minimally invasive surgical procedures to place stabilizing titanium bars (or screws and bone graft depending on the system used) through a small incision over the buttock that have been shown to have greater than 90% pain relief in carefully selected patients.  It is critical to properly identify patients with at least 2 excellent responses (> 60-70% relief depending on criteria used) to sacroiliac joint injections prior to considering any surgical intervention.    He has requested referral to a different pain management doctor and we gave him 2 names.  I do believe we should proceed with a diagnostic left sacroiliac joint injection.    2) Left peroneal neuropathy (recurrent): He has persistent symptoms in the distal left lower extremity.  Although he does have an EMG which shows acute left S1 radiculopathy, there is no compression of the S1 nerve root.  Given the complexity of his case and persistent symptoms and the fact that he is status post 2 separate common peroneal releases, I recommend he be evaluated by a peripheral nerve  specialist with extensive experience such as Dr. Sam Mcrae at hospitals.  Other than entrapment, there are other rare causes of peroneal neuropathy such as intraneural ganglion cysts from the articular branch from the knee.  He may require further imaging of the area to better characterize both the knee, the neural branches and the peroneal nerve itself.    3) Lumbar spondylosis: Although he has spondylosis of the lumbar spine, I do not see significant stenosis that would explain his symptoms or warrant any surgical intervention.  I would not recommend a multilevel fusion, which he has been offered, to help with his back pain given the unpredictable and oftentimes unsatisfying results of fusion procedures for back pain in the absence of radicular symptoms or claudication or instability.    All questions were answered.  Thank you very much for the opportunity to be involved in the care of your patient.  Please call the office should any questions or problems arise.    Sincerely,       Jan Mari MD

## 2021-04-01 NOTE — LETTER
April 1, 2021     Billy Jordan MD  [address] [place] [address]    Patient: Al Gaytan  YOB: 1970  Date of Visit: 4/1/2021      Dear Dr. Jordan:    Thank you for referring Al Gaytan to me for evaluation. Below are my notes for this consultation.    If you have questions, please do not hesitate to call me. I look forward to following your patient along with you.         Sincerely,        Jan Mari MD        CC: No Recipients  Jan Mari MD  4/1/2021  1:35 PM  Signed  Dear Dr. Jordan,    I had the pleasure of meeting a patient of yours in the office today.  Thank you for your kind referral.  As you may recall, Mr. Al Gaytan is a pleasant 51-year-old gentleman who presents with severe lower left-sided back pain and significant pain, paresthesias, and weakness in his left lower extremity.  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  He reports that he has had chronic pain in the distal left lower extremity for many years.  His past medical history of left knee arthroscopy x2 in 2012 which precipitated the onset of dysesthetic pain, numbness, and paresthesias in the left anterior lateral shin radiating into the dorsum of his left foot.  He has undergone 2 left common peroneal nerve decompressions after his knee procedure in 2013 in 2018.  Unfortunately he developed left-sided foot drop as well as weakness in plantar flexion of his left foot and ambulatory dysfunction.  His imaging findings from 2019 had revealed left-sided lateral recess stenosis at L4-5 causing compression of the traversing L5 nerve root.  Due to the weakness and severe pain in his left leg he underwent left-sided hemilaminotomies medial facetectomies at L4-5 and L5-S1 in 2019.  After this procedure he did not have improvement of his pain or improvement of foot drop.     Concomitantly, he has had left-sided lower back pain which radiates into  the buttock and also down the posterior aspect of the right thigh.  The pain is focal to the left SI joint area and reproducible with palpation.  Pain radiating from the left sacroiliac joint is associated with tingling and numbness. Seated positions, transitional maneuvers from seated to standing, and ambulating exacerbate the lower back pain.  In a completely flexed position while seated he will have numbness in his left leg.  He walks with a limp, has had frequent falls due to weakness in his left foot and has recently suffered a metatarsal fracture.  He has not been fitted for MAFO bracing in the past.       I have reviewed his records from Dr. Lobato of pain management and the op report from Dr. Kelly.  He has undergone multiple transforaminal epidural steroid injections on the left at L3-4, L4-5, and L5-S1.  He has also undergone medial branch blocks with maximum 25% improvement of pain which was not long-lasting.  Due to the severity of his symptoms, neurologic deficits, failure to improve after surgical and conservative measures, and interruption in the activities of his daily living, he was referred for a neurosurgical opinion.    Imaging: I personally reviewed his lumbar MRI studies and ordered him x-rays with dynamic views.  He has fairly well-preserved lumbar lordosis and there is no instability on dynamic views.  He does have spondylosis with 40% loss of disc space height at L4-5 and L5-S1.  He has a left-sided L4-5 hemilaminotomy and medial facetectomy and there is scar tissue around the L5 nerve root but I do not see any ongoing compression.  He has a central annular tear and bulge at L5-S1 but the S1 nerve root is not compressed in the lateral recess.            L4-5      L5-S1      Review of Systems: 14 point review of systems are negative except for the following pertinent positives: Weight gain, sensitivity to light, lumbar degenerative disc disease, back pain, neuropathy, headaches, joint pain,  stiffness, disturbance in gait, numbness, and weakness.    Medical History:   Past Medical History:   Diagnosis Date   • Acid reflux    • HNP (herniated nucleus pulposus), lumbar    • Lumbar neuritis    • Lumbar radiculitis    • Migraine     occasional stress related    • Neuropathy     lower left extremity   • Pneumonia      x 2 when younger   • Sciatica     left side   • Seasonal allergies     spring and fall   • Snores        Surgical History:   Past Surgical History:   Procedure Laterality Date   • FOOT SURGERY Bilateral    • KNEE SURGERY Left     multiple       Social History:   Social History     Socioeconomic History   • Marital status:      Spouse name: None   • Number of children: None   • Years of education: None   • Highest education level: None   Occupational History   • None   Social Needs   • Financial resource strain: None   • Food insecurity     Worry: None     Inability: None   • Transportation needs     Medical: None     Non-medical: None   Tobacco Use   • Smoking status: Never Smoker   • Smokeless tobacco: Never Used   Substance and Sexual Activity   • Alcohol use: Yes     Comment: rarely   • Drug use: Never   • Sexual activity: Defer   Lifestyle   • Physical activity     Days per week: None     Minutes per session: None   • Stress: None   Relationships   • Social connections     Talks on phone: None     Gets together: None     Attends Scientologist service: None     Active member of club or organization: None     Attends meetings of clubs or organizations: None     Relationship status: None   • Intimate partner violence     Fear of current or ex partner: None     Emotionally abused: None     Physically abused: None     Forced sexual activity: None   Other Topics Concern   • None   Social History Narrative   • None       Family History: No family history on file.    Allergies: Monosodium glutamate, Oxycodone, and Latex    Current Outpatient Medications:   •  gabapentin (NEURONTIN) 300 mg  capsule, Take 300 mg by mouth 3 (three) times a day.  , Disp: , Rfl: 3  •  omeprazole (PriLOSEC) 20 mg capsule, Take 20 mg by mouth as needed.  , Disp: , Rfl:   •  ZIPSOR 25 mg capsule, TAKE 2 CAPSULES BY MOUTH TWICE DAILY AFTER MEALS, Disp: , Rfl:   •  ZORVOLEX 35 mg capsule, 35 mg 2 (two) times a day.  , Disp: , Rfl: 0      Physicial Exam    Vital Signs   Vitals:    04/01/21 1108   BP: (!) 147/86   Pulse: 83   Resp: 18   Temp: 36.6 °C (97.8 °F)   SpO2: 98%       Awake, alert, oriented to person, place, time and situation; speech and fund of knowledge normal.  Neck is supple w/ FROM, is nontender and no JVD. Chest CTA without rales. Heart has a regular rate and rhythm without murmur. Abdomen soft, NT, ND, + BS.     Neurological examination:    PERRL, extra-ocular movements intact, face symmetric, tongue protrudes to midline, no nystagmus or diplopia.  Motor:     RUE:  D  5/5, B  5/5, T 5/5, WE 5/5, HG 5/5, IH 5/5   LUE:  D  5/5, B  5/5, T 5/5, WE 5/5, HG 5/5, IH 5/5   RLE:  IP  5/5, Q  5/5, DF 5/5, EHL 5/5, PF 5/5   LLE:  IP  5/5, Q  4/5 (PL), DF 2/5, EHL 4-/5, PF 3/5  +Ecchymosis to 3/4th metatarsals with acute fracture  Reflexes:  Normoreflexive, no Bess's or Babinski signs, no clonus  Sensory exam:  Intact to light touch, pain, temperature and JPS testing  Gait and posture normal. He is unable to fully weight bear on LLE 2/2 to pain.   +Tenderness across the left SI joint with +provocative testing to Bakari and thigh thrust maneuvers  No dysmetria.      Assessment/Plan     This is a 51-year-old gentleman who has had a prior left L4-5 hemilaminotomy and 2 common peroneal nerve releases.  He has chronic left foot drop, left-sided lower back and buttock pain and pain in the left leg, distal to the knee.    1) Left sacro-iliac joint dysfunction: The patient has a severe gait abnormality due to longstanding left knee and peroneal neuropathy issues.  This certainly could predispose him to hip and sacroiliac joint  dysfunction in addition to throwing off the alignment in his lower back.  Thus, his left-sided lower back pain is likely multifactorial.  However, he has provocative testing consistent with sacroiliac joint dysfunction.    Pain in the lumbosacral and sacroiliac regions can have multiple etiologies.  Patient has pain over the bilateral sacroiliac joints and certainly may have a component of sacroiliac joint arthritis and dysfunction.  Imaging findings have not been shown to correlate with sacroiliac joint symptoms.  The diagnostic/therapeutic procedure of choice is a sacroiliac injection with confirmatory arthrography using an anesthetic with or without steroids.  I stressed to the patient that it is critical to document their VAS pain score doing there is activities in the days or week prior to the injection.  The patient should then focus on the 6 hours after the procedure while the anesthetic medication is working.  The steroids may or may not have a delayed effect (therapeutic aspect of the injection) but the diagnostic portion of the injection is in the first 6 hours while the joint is anesthetized.  If they experience significant relief (even if temporary), this helps identify the sacroiliac joint as at least 1 of the dominant pain generators.  The patient could be a candidate for repeat injections or she may be a candidate for sacroiliac joint ablation  (radiofrequency ablation).  There are also minimally invasive surgical procedures to place stabilizing titanium bars (or screws and bone graft depending on the system used) through a small incision over the buttock that have been shown to have greater than 90% pain relief in carefully selected patients.  It is critical to properly identify patients with at least 2 excellent responses (> 60-70% relief depending on criteria used) to sacroiliac joint injections prior to considering any surgical intervention.    He has requested referral to a different pain  management doctor and we gave him 2 names.  I do believe we should proceed with a diagnostic left sacroiliac joint injection.    2) Left peroneal neuropathy (recurrent): He has persistent symptoms in the distal left lower extremity.  Although he does have an EMG which shows acute left S1 radiculopathy, there is no compression of the S1 nerve root.  Given the complexity of his case and persistent symptoms and the fact that he is status post 2 separate common peroneal releases, I recommend he be evaluated by a peripheral nerve specialist with extensive experience such as Dr. Sam Mcrae at [facility].  Other than entrapment, there are other rare causes of peroneal neuropathy such as intraneural ganglion cysts from the articular branch from the knee.  He may require further imaging of the area to better characterize both the knee, the neural branches and the peroneal nerve itself.    3) Lumbar spondylosis: Although he has spondylosis of the lumbar spine, I do not see significant stenosis that would explain his symptoms or warrant any surgical intervention.  I would not recommend a multilevel fusion, which he has been offered, to help with his back pain given the unpredictable and oftentimes unsatisfying results of fusion procedures for back pain in the absence of radicular symptoms or claudication or instability.    All questions were answered.  Thank you very much for the opportunity to be involved in the care of your patient.  Please call the office should any questions or problems arise.    Sincerely,       Jan Mari MD

## 2022-05-23 ENCOUNTER — TELEPHONE (OUTPATIENT)
Dept: NEUROSURGERY | Facility: CLINIC | Age: 52
End: 2022-05-23
Payer: COMMERCIAL

## 2022-05-23 NOTE — TELEPHONE ENCOUNTER
Spoke with patient.  He is having continued pain that is getting worse in his left lower side of his back.  He has sciatic pain on the left side as well.  He complains of numbness, weakness and tingling in his left leg.  He has not done any recent physical therapy or injections.    He had updated imaging MRI/xray at [facility] and will be bringing the disc at his appt on 6/3/22.

## 2022-06-03 ENCOUNTER — OFFICE VISIT (OUTPATIENT)
Dept: NEUROSURGERY | Facility: CLINIC | Age: 52
End: 2022-06-03
Payer: COMMERCIAL

## 2022-06-03 VITALS
RESPIRATION RATE: 20 BRPM | BODY MASS INDEX: 40.18 KG/M2 | TEMPERATURE: 98.4 F | HEIGHT: 66 IN | DIASTOLIC BLOOD PRESSURE: 91 MMHG | HEART RATE: 79 BPM | SYSTOLIC BLOOD PRESSURE: 151 MMHG | OXYGEN SATURATION: 98 % | WEIGHT: 250 LBS

## 2022-06-03 DIAGNOSIS — M51.26 LUMBAR HERNIATED DISC: Primary | ICD-10-CM

## 2022-06-03 PROCEDURE — 99213 OFFICE O/P EST LOW 20 MIN: CPT | Performed by: NEUROLOGICAL SURGERY

## 2022-06-03 PROCEDURE — 3008F BODY MASS INDEX DOCD: CPT | Performed by: NEUROLOGICAL SURGERY

## 2022-06-03 RX ORDER — IBUPROFEN 800 MG/1
TABLET ORAL EVERY 8 HOURS
COMMUNITY

## 2022-06-03 RX ORDER — TIZANIDINE 2 MG/1
4 TABLET ORAL EVERY 6 HOURS PRN
Qty: 60 TABLET | Refills: 3 | Status: SHIPPED | OUTPATIENT
Start: 2022-06-03 | End: 2022-06-17

## 2022-06-03 NOTE — PROGRESS NOTES
Dear Dr. Jordan,    Mr. Gaytan returned to the office for follow-up.    I initially evaluated him on 4/1/2021 and the details of my initial assessment and plan are documented in that note.  He continues to have his left lower extremity symptoms.  He recently underwent a peroneal nerve peripheral nerve stimulator and this is helping his lower leg pain.  He did undergo left-sided sacroiliac joint injections and they did not provide significant relief.  He still has some pain and what appears to be the L5 and S1 distributions but again his imaging does not show any persistent compression of these nerves.  He is having worsening back pain and spasms but no new radicular symptoms, particularly in the L4 distribution.  He does have a central disc herniation at L3-4 with mild canal stenosis but it is not severely compressing the traversing nerve roots.  He also has a spondylotic disc at T10-11 where there is significant collapse of the disc space but there is no disc bulging or stenosis or cord compression.  At this time, I do not recommend any surgical intervention.  I believe he may benefit from epidural injections.  We did discuss the natural history of disc bulges and herniations and most have a benign course that do not require surgery.  I spent 20 minutes on this date of service performing the following activities: Preparing for the visit, obtaining/reviewing records, independently reviewing studies, obtaining history/performing examination, counseling and education, ordering tests, medications and studies, communicating results, coordinating care and documentation.    Thank you very much for the opportunity to be involved in the care of your patient.  Please call the office should any questions or problems arise.    Sincerely,       Jan Mari MD

## 2022-06-03 NOTE — LETTER
Laura 3, 2022     Billy Jordan MD  [address]  [place] [address]    Patient: Al Gaytan  YOB: 1970  Date of Visit: 6/3/2022      Dear Dr. Jordan:    Thank you for referring Al Gaytan to me for evaluation. Below are my notes for this consultation.    If you have questions, please do not hesitate to call me. I look forward to following your patient along with you.         Sincerely,        Jan Mari MD        CC: No Recipients  Jan Mari MD  6/3/2022 12:59 PM  Signed  Dear Dr. Jordan,    Mr. Gaytan returned to the office for follow-up.    I initially evaluated him on 4/1/2021 and the details of my initial assessment and plan are documented in that note.  He continues to have his left lower extremity symptoms.  He recently underwent a peroneal nerve peripheral nerve stimulator and this is helping his lower leg pain.  He did undergo left-sided sacroiliac joint injections and they did not provide significant relief.  He still has some pain and what appears to be the L5 and S1 distributions but again his imaging does not show any persistent compression of these nerves.  He is having worsening back pain and spasms but no new radicular symptoms, particularly in the L4 distribution.  He does have a central disc herniation at L3-4 with mild canal stenosis but it is not severely compressing the traversing nerve roots.  He also has a spondylotic disc at T10-11 where there is significant collapse of the disc space but there is no disc bulging or stenosis or cord compression.  At this time, I do not recommend any surgical intervention.  I believe he may benefit from epidural injections.  We did discuss the natural history of disc bulges and herniations and most have a benign course that do not require surgery.  I spent 20 minutes on this date of service performing the following activities: Preparing for the visit, obtaining/reviewing records, independently reviewing  studies, obtaining history/performing examination, counseling and education, ordering tests, medications and studies, communicating results, coordinating care and documentation.    Thank you very much for the opportunity to be involved in the care of your patient.  Please call the office should any questions or problems arise.    Sincerely,       Jan aMri MD

## 2022-06-09 ENCOUNTER — DOCUMENTATION (OUTPATIENT)
Dept: NEUROSURGERY | Facility: CLINIC | Age: 52
End: 2022-06-09
Payer: COMMERCIAL

## 2022-06-09 NOTE — PROGRESS NOTES
June 9, 2022      Al Gaytan  [address] [place]  [address]        Dear Mr. Gaytan,    This letter is in response to your request for a record review.  I initially evaluated you on 4/1/2021 and again on 6/3/2002.  I have thoroughly reviewed all pertinent imaging, EMG studies, pain management notes, orthopedic notes and extensive documentation from Dr. Diaz, your podiatrist.  Dr. Diaz's letter from 5/25/2021 provides an excellent and thorough review of your injuries, accepted diagnoses from a Worker's Compensation perspective, diagnoses deemed separate from your work-related injury, treatments as well as an excellent summary of the various practitioners of different specialties who have been involved in your care.  I will provide a quick summary as to my understanding of your initial injury and relationship to subsequent diagnoses you have, some of which have required further surgical intervention.      Per report, you had an initial ACL injury repaired in 2000.  You subsequently underwent 2 arthroscopic surgical procedures in 2012, the first on 6/1/2012 and the second on 7/19/2012.  The first involved debridement of a torn meniscus and an ACL injury was also believed to be present.  The second operation was halted as, per her report, the ACL did not require repair.  On the evening of surgery, you subsequently developed pain from your left knee down to your leg and foot.  A common peroneal nerve injury was accepted as a consequential injury to the torn left meniscus and ACL injuries.  You also have accepted diagnoses of left foot drop, altered gait due to left lower extremity issues, and left foot neuromas.  He also did develop neuromas in the right foot that required surgical removal.  Subsequently developed left L5 radicular symptoms and diagnosed with a herniated disc.  After failing conservative management, you underwent a decompression surgery with orthopedic surgery.  You continue to  have pain in your lower back and left leg.  You subsequently had an EMG which shows left S1 radiculopathy.    I have reviewed your quantitative plantar pressure assessment at [facility].  They did confirm that you have a drop foot on the left with plantarflexion of your ankle upon contact.  The loading is limited to the forefoot only.  Your right foot was also found to have an equinus gait with, again, initial contact with the forefoot.  Such a gait does require repetitive flexion at the waist and this will result in a very altered gait cycle.    The patient returns with new imaging in addition to the gait assessment described above.  Your new MRI shows excellent decompression of the left L5 and S1 nerve roots.  However, you have developed a diffuse disc bulge and central herniation at L3-4.  This MRI study also included the lower thoracic region.  There is T10-11 spondylosis and collapse of the disc space without stenosis.  I reviewed his old MRI and do not see that it included the T10-11 level and I cannot comment directly on the rapidity of progression of the spondylosis.    I have reviewed the patient's job description and again am familiar with his accepted work injuries due to the April 11, 2000 work injury.  I agree with the patient's other treating physicians (including Dr. Diaz, Dr. Allen, Dr. Kelly and Dr. Lemus) that the patient's altered gait, which is directly due to his left lower extremity injuries, peroneal nerve injury and foot drop, directly and causally contributed to the patient's lumbar degenerative disc disease and need for surgical intervention.  I believe it continues to affect his lower back as evidenced by the progressive deterioration at the L3-4 level, which is new and not present on his prior imaging.  Given this direct and causal relationship, it is my opinion, within a reasonable degree of medical certainty, that his lumbar degenerative disc disease, need for lumbar surgery  and likely need for future lumbar procedures, treatments and possible surgery, is causally connected to the patient's accepted work injury of the left knee.    Please feel free to contact my office with any questions.    Sincerely,         Jan Mari MD

## 2022-06-09 NOTE — LETTER
June 9, 2022        Al Gaytan  [address] [place]  [address]           Dear Mr. Gaytan,     This letter is in response to your request for a record review.  I initially evaluated you on 4/1/2021 and again on 6/3/2002.  I have thoroughly reviewed all pertinent imaging, EMG studies, pain management notes, orthopedic notes and extensive documentation from Dr. Diaz, your podiatrist.  Dr. Diaz's letter from 5/25/2021 provides an excellent and thorough review of your injuries, excepted diagnoses from a Worker's Compensation perspective, diagnoses deemed separate from your work-related injury, treatments as well as an excellent summary of the various practitioners of different specialties who have been involved in your care.  I will provide a quick summary as to my understanding of your initial injury and relationship to subsequent diagnoses you have, some of which have required further surgical intervention.       Per report, you had an initial ACL injury repaired in 2000.  He subsequently underwent 2 arthroscopic surgical procedures in 2012, the first on 6/1/2012 and the second on 7/19/2012.  The first involve debridement of a torn meniscus and an ACL injury was also believed to be present.  The second operation was halted as, per her report, the ACL did not require repair.  On the evening of surgery subsequently developed pain from her left knee down to her leg and foot.  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  You also have excepted diagnoses of left foot drop, altered gait due to left lower extremity issues, and left foot neuromas.  He also did develop neuromas in the right foot that required surgical removal.  Subsequently developed left L5 radicular symptoms and diagnosed with a herniated disc.  After failing conservative management, you underwent a decompression surgery with orthopedic surgery.  You continue to  have pain in your lower back and left leg.  You subsequently had an EMG which shows left S1 radiculopathy.     Have reviewed your quantitative plantar pressure assessment at [facility].  They did confirm that you have a dropfoot on the left with plantarflexion of your ankle upon contact.  The loading is limited to the forefoot only.  Your right foot was also found to have an equinus gait with, again, initial contact with the forefoot.  Such a gait does require repetitive flexion at the waist and this will result in a very altered gait cycle.     The patient returns with new imaging in addition to the gait assessment described above.  His new MRI shows excellent decompression of the left L5 and S1 nerve roots.  However he has developed a diffuse disc bulge and central herniation at L3-4.  This MRI study also included the lower thoracic region.  There is T10-11 spondylosis and collapse of the disc space without stenosis.  I reviewed his old MRI and do not see that it included the T10-11 level and I cannot comment directly on the rapidity of progression of the spondylosis.     I have reviewed the patient's job description and again and familiar with his excepted work injuries due to the April 11, 2000 work injury.  I agree with the patient's other treating physicians (including Dr. Diaz, Dr. Allen, Dr. Kelly and Dr. Lemus) that the patient's altered gait, which is directly due to his left lower extremity injuries, peroneal nerve injury and foot drop, directly and causally contributed to the patient's lumbar degenerative disc disease and need for surgical intervention.  I believe it continues to affect his lower back as evidenced by the progressive deterioration at the L3-4 level, which is new and not present on his prior imaging.  Given this direct and causal relationship, it is my opinion, within a reasonable degree of medical certainty, that his lumbar degenerative disc disease, need for lumbar surgery and  likely need for future lumbar procedures, treatments and possible surgery, is causally connected to the patient's excepted work injury of the left knee.     Please feel free to contact my office with any questions.     Sincerely,            Jan Mari MD

## 2022-06-27 RX ORDER — TIZANIDINE 2 MG/1
TABLET ORAL
COMMUNITY
Start: 2022-06-27 | End: 2022-06-27 | Stop reason: SDUPTHER

## 2022-06-27 RX ORDER — TIZANIDINE 2 MG/1
2 TABLET ORAL EVERY 6 HOURS PRN
Qty: 180 TABLET | Refills: 0 | Status: SHIPPED | OUTPATIENT
Start: 2022-06-27 | End: 2022-08-03

## 2022-06-27 NOTE — TELEPHONE ENCOUNTER
90 day request    Requested Prescriptions     Pending Prescriptions Disp Refills   • tiZANidine (ZANAFLEX) 2 mg tablet 180 tablet 0     Sig: Take 1 tablet (2 mg total) by mouth every 6 (six) hours as needed for muscle spasms.         [facility]/pharmacy #[id number] - [place] - [address] [facility] [address]  [address] [place] [address]  Phone: [phone] Fax: [phone]

## 2022-06-30 ENCOUNTER — TELEPHONE (OUTPATIENT)
Dept: NEUROSURGERY | Facility: CLINIC | Age: 52
End: 2022-06-30
Payer: COMMERCIAL

## 2022-06-30 NOTE — TELEPHONE ENCOUNTER
Patient came to  normative letter from Dr Mari. Also signed authorization which is scanned in patients chart. Gave him back blue folder with records.

## 2022-08-03 RX ORDER — TIZANIDINE 2 MG/1
TABLET ORAL
Qty: 180 TABLET | Refills: 0 | Status: SHIPPED | OUTPATIENT
Start: 2022-08-03

## 2022-08-03 NOTE — TELEPHONE ENCOUNTER
Pharmacy request    Requested Prescriptions     Pending Prescriptions Disp Refills   • tiZANidine (ZANAFLEX) 2 mg tablet [Pharmacy Med Name: TIZANIDINE HCL 2 MG TABLET] 180 tablet 0     Sig: TAKE 1 TABLET BY MOUTH EVERY 6 HOURS AS NEEDED FOR MUSCLE SPASMS.         [facility]/pharmacy #[id number] - [place] - [address]  [address] [facility] [address]  Phone: [phone] Fax: [phone]

## 2023-01-27 NOTE — ANESTHESIA PREPROCEDURE EVALUATION
Relevant Problems   MUSCULOSKELETAL   (+) Lumbar degenerative disc disease      NEUROLOGY   (+) Lumbar radiculopathy       Past Surgical History:   Procedure Laterality Date   • FOOT SURGERY Bilateral    • KNEE SURGERY Left     multiple     Patient Active Problem List   Diagnosis   • Lumbar degenerative disc disease   • HNP (herniated nucleus pulposus), lumbar   • Lumbar radiculopathy       Current Facility-Administered Medications   Medication Dose Route Frequency   • ceFAZolin in sterile water  2 g intravenous Once   • sodium chloride 0.9 %   intravenous Continuous   • vancomycin  1,500 mg intravenous Once       Prior to Admission medications    Medication Sig Start Date End Date Taking? Authorizing Provider   gabapentin (NEURONTIN) 300 mg capsule Take 300 mg by mouth 3 (three) times a day.   7/9/19  Yes Marychuy Rehman MD   ZORVOLEX 35 mg capsule 35 mg 2 (two) times a day.   7/30/19  Yes Marychuy Rehman MD   omeprazole (PriLOSEC) 20 mg capsule Take 20 mg by mouth as needed.      ProviderMarychuy MD       CBC Results     No lab values to display.          BMP Results     No lab values to display.          No results found for: HCGPREGUR, PREGSERUM, HCG, HCGQUANT          X-RAY LUMBAR SPINE 2 OR 3 VIEWS    (Results Pending)       Physical Exam    Airway   Mallampati: IV   TM distance: >3 FB   Neck ROM: full  Cardiovascular - normal   Rhythm: regular   Rate: normalPulmonary - normal   clear to auscultation  Dental    Teeth Problems: caps          Anesthesia Plan    Plan: general    Technique: general endotracheal and total IV anesthesia     Lines and Monitors: PIV     Airway: natural airway / supplemental oxygen, oral intubation and direct visual laryngoscopy   ASA 2  Induction:    intravenous      no

## 2023-05-06 NOTE — TELEPHONE ENCOUNTER
From: Destiny Moreno  To: Patrick Davis  Sent: 4/29/2023 8:57 AM CDT  Subject: Nerve test    We missed our visit. Can you take a look at my results in my chart to see what the next steps for treatment will be   Scoliosis  Lumbar complete xrays

## 2025-05-14 ENCOUNTER — TELEPHONE (OUTPATIENT)
Dept: ORTHOPEDICS | Facility: CLINIC | Age: 55
End: 2025-05-14

## 2025-05-14 NOTE — TELEPHONE ENCOUNTER
Provider: Dr. Thomas   Appointment Type: NPV   Reason for Visit: Referred by Dr. Cardenas for Surgery Pt has Building Disc on Lumbar spine and will be using federal Workmans comp claim insurance   Active Symptoms: Yes Pain across the lower back and down left leg and numbeness  Onset of Symptoms: 1 month   Available Day and Time:   Best Contact Number: [phone]

## 2025-05-15 ENCOUNTER — TELEPHONE (OUTPATIENT)
Dept: NEUROSURGERY | Facility: CLINIC | Age: 55
End: 2025-05-15
Payer: COMMERCIAL

## 2025-06-24 ENCOUNTER — OFFICE VISIT (OUTPATIENT)
Dept: NEUROSURGERY | Facility: CLINIC | Age: 55
End: 2025-06-24
Payer: OTHER MISCELLANEOUS

## 2025-06-24 VITALS
RESPIRATION RATE: 18 BRPM | HEART RATE: 89 BPM | WEIGHT: 250 LBS | HEIGHT: 66 IN | OXYGEN SATURATION: 96 % | DIASTOLIC BLOOD PRESSURE: 88 MMHG | SYSTOLIC BLOOD PRESSURE: 142 MMHG | BODY MASS INDEX: 40.18 KG/M2

## 2025-06-24 DIAGNOSIS — M51.16 LUMBAR DISC HERNIATION WITH RADICULOPATHY: Primary | ICD-10-CM

## 2025-06-24 NOTE — PROGRESS NOTES
Dear Dr. Cardenas,    I had the pleasure of meeting a patient of yours in the office today.  Thank you for your kind referral.      He has contended with left radiculopathy for several years, he suffered an injury 2012. He notes chronic numbness/reduced sensation in the left lower extremity. This past April he acutely developed lumbosacral discomfort which he describes as cramping  and constant in nature. He denies any new inciting injury or trauma. The pain will sporadically extend into the left lateral lower extremity into the ankle. He had one episode extending into the right posterior thigh into the knee a month ago. Bending forward exacerbates his symptoms as well as postural activities. He has undergone chiropractor therapy without relief. He has had numerous injections in the past as well and has followed with pain management, Dr. Santoyo and Dr. Cardenas. He just completed a course of prednisone which provided partial relief. He notes the left leg feels weaker than the right and he at times does not feel when his left foot touches the ground. He denies any bowel or bladder dysfunction.     Imaging:  I have personally reviewed his lumbar MRI and ordered X-Rays with dynamic views.  He has good preservation of lordosis and there is no instability.  He has mild spondylosis with loss of disc space height and loss of T2 signal change at L3-4, L4-5 and L5-S1.  He has had a prior left L4-5 hemilaminotomy.  There is good decompression of the traversing left L5 nerve root in the lateral recess but there is a recurrent small disc herniation laterally in the defect compressing the traversing L5 root and contacting the exiting L4 root.  There is now a new diffuse herniation at L3-4, right side worse than left, with compression of the bilateral traversing L4 nerve roots.    Review of Systems: 14 point review of systems are negative except for the symptoms mentioned in the HPI.    Medical History:   Past Medical History:    Diagnosis Date    Acid reflux     HNP (herniated nucleus pulposus), lumbar     Low back pain     Lumbar neuritis     Lumbar radiculitis     Lumbosacral disc disease     Migraine     occasional stress related     Neuropathy     lower left extremity    Pneumonia      x 2 when younger    Sciatica     left side    Seasonal allergies     spring and fall    Snores     Thoracic disc disorder        Surgical History:   Past Surgical History   Procedure Laterality Date    Foot surgery Bilateral     Knee surgery Left     multiple    L4-5 Posterior Lumbar Decompression Discectomy N/A 10/30/2019    Performed by Clarence Kelly MD at  OR       Social History:   Social History     Socioeconomic History    Marital status:      Spouse name: None    Number of children: None    Years of education: None    Highest education level: None   Tobacco Use    Smoking status: Never    Smokeless tobacco: Never   Substance and Sexual Activity    Alcohol use: Yes     Comment: rarely    Drug use: Never    Sexual activity: Defer       Family History:   Family History   Problem Relation Name Age of Onset    No Known Problems Biological Mother      No Known Problems Biological Father         Allergies: Monosodium glutamate, Oxycodone, and Latex    Home Medications:      Current Medications:  Current Outpatient Medications   Medication Sig Dispense Refill    cholecalciferol, vitamin D3, (VITAMIN D3 ORAL) Take by mouth.      DICLOFENAC POTASSIUM ORAL       gabapentin (NEURONTIN) 300 mg capsule Take 300 mg by mouth 3 (three) times a day.    3    omeprazole (PriLOSEC) 20 mg capsule Take 20 mg by mouth as needed.        vitamin B complex (B COMPLEX ORAL) Take by mouth.      ibuprofen (MOTRIN) 800 mg tablet every 8 hours.      tiZANidine (ZANAFLEX) 2 mg tablet TAKE 1 TABLET BY MOUTH EVERY 6 HOURS AS NEEDED FOR MUSCLE SPASMS. (Patient not taking: No sig reported) 180 tablet 0    ZIPSOR 25 mg capsule TAKE 2 CAPSULES BY MOUTH TWICE DAILY AFTER  MEALS (Patient not taking: Reported on 6/20/2025)      ZORVOLEX 35 mg capsule 35 mg 2 (two) times a day.   (Patient not taking: Reported on 6/20/2025)  0     No current facility-administered medications for this visit.         Physicial Exam    Vital Signs   Vitals:    06/24/25 1542   BP: (!) 142/88   Pulse: 89   Resp: 18   SpO2: 96%       Awake, alert, oriented to person, place, time and situation; speech and fund of knowledge normal. Head NC/AT.  PERRL, extra-ocular movements intact, face symmetric, tongue protrudes to midline, no nystagmus or diplopia.  Neck is supple, has full range of motion and there is no evidence of JVD.  There is no tenderness to palpation.  Breathing comfortably without distress, tachypnea, wheezing or use of accessory muscles.  Heart has a regular rate and rhythm. Abdomen ND.   Skin is warm, well perfused, with good capillary refill.  Limbs show no deformities or edema.    Neurological examination:    PERRL, extra-ocular movements intact, face symmetric, tongue protrudes to midline, no nystagmus or diplopia.  Motor:     RUE:  D  5/5, B  5/5, T 5/5, WE 5/5, HG 5/5, IH 5/5   LUE:  D  5/5, B  5/5, T 5/5, WE 5/5, HG 5/5, IH 5/5   RLE:  IP  5/5, Q  5/5, DF 5/5, EHL 5/5, PF 5/5   LLE:  IP  5/5, Q  5/5, DF 5/5, EHL 5/5, PF 5/5  Reflexes:  Normoreflexive, no Bess's or Babinski signs, no clonus  Sensory exam:  Intact to light touch, pain, temperature and JPS testing  He cannot sit or  any one position for any period of time and needs to keep changing positions.    Assessment/Plan     This is a 56 yo M s/p prior left L4-5 decompression with new onset L > R L4 distribution pain and recurrent L L5 pain.  He is debilitated by pain.  He has had numerous injections with varying levels of relief.  He wishes to proceed with surgical intervention.  I offered him bilateral L3-4 hemilaminotomies and microdiscectomies and revision left L4-5 decompression and foraminotomy.  He understands the  decision to pursue surgery comes down to an issue of quality of life as he is not losing neurological function that would warrant urgent surgical intervention.    We discussed the risks benefits and alternatives of surgery in detail.  The risks that we discussed include those of general anesthesia, medical complications, infection, blood loss possibly requiring transfusion, vascular or other organ injury, neurological worsening, failure to alleviate preoperative symptoms, failure to improve neurological loss if present, need for further surgery in the future either due to progressive disease or iatrogenic worsening, CSF leak (requiring bedrest, prolonged drainage, recurrent surgery, etc), recurrent herniated disc (10% at 10 years in longer studies).      He does wish to proceed with surgery.  All questions answered.  Thank you very much for the opportunity to be involved in the care of your patient.  Please call the office should any questions or problems arise.    Sincerely,       Jan Mari MD

## 2025-06-24 NOTE — LETTER
June 25, 2025     Ion Cardenas, DPGIO  [address] [facility] [address]    Patient: Al Gaytan  YOB: 1970  Date of Visit: 6/24/2025      Dear Dr. Cardenas:    Thank you for referring Al Gaytan to me for evaluation. Below are my notes for this consultation.    If you have questions, please do not hesitate to call me. I look forward to following your patient along with you.         Sincerely,        Jan Mari MD        CC: MD Gómez Alas MD Elliott, Robert E, MD  6/25/2025 10:54 AM  Sign when Signing Visit  Dear Dr. Cardenas,    I had the pleasure of meeting a patient of yours in the office today.  Thank you for your kind referral.      He has contended with left radiculopathy for several years, he suffered an injury 2012. He notes chronic numbness/reduced sensation in the left lower extremity. This past April he acutely developed lumbosacral discomfort which he describes as cramping  and constant in nature. He denies any new inciting injury or trauma. The pain will sporadically extend into the left lateral lower extremity into the ankle. He had one episode extending into the right posterior thigh into the knee a month ago. Bending forward exacerbates his symptoms as well as postural activities. He has undergone chiropractor therapy without relief. He has had numerous injections in the past as well and has followed with pain management, Dr. Santoyo and Dr. Cardenas. He just completed a course of prednisone which provided partial relief. He notes the left leg feels weaker than the right and he at times does not feel when his left foot touches the ground. He denies any bowel or bladder dysfunction.     Imaging:  I have personally reviewed his lumbar MRI and ordered X-Rays with dynamic views.  He has good preservation of lordosis and there is no instability.  He has mild spondylosis with loss of disc space height and loss of T2 signal  change at L3-4, L4-5 and L5-S1.  He has had a prior left L4-5 hemilaminotomy.  There is good decompression of the traversing left L5 nerve root in the lateral recess but there is a recurrent small disc herniation laterally in the defect compressing the traversing L5 root and contacting the exiting L4 root.  There is now a new diffuse herniation at L3-4, right side worse than left, with compression of the bilateral traversing L4 nerve roots.    Review of Systems: 14 point review of systems are negative except for the symptoms mentioned in the HPI.    Medical History:   Past Medical History:   Diagnosis Date   • Acid reflux    • HNP (herniated nucleus pulposus), lumbar    • Low back pain    • Lumbar neuritis    • Lumbar radiculitis    • Lumbosacral disc disease    • Migraine     occasional stress related    • Neuropathy     lower left extremity   • Pneumonia      x 2 when younger   • Sciatica     left side   • Seasonal allergies     spring and fall   • Snores    • Thoracic disc disorder        Surgical History:   Past Surgical History   Procedure Laterality Date   • Foot surgery Bilateral    • Knee surgery Left     multiple   • L4-5 Posterior Lumbar Decompression Discectomy N/A 10/30/2019    Performed by Clarence Kelly MD at  OR       Social History:   Social History     Socioeconomic History   • Marital status:      Spouse name: None   • Number of children: None   • Years of education: None   • Highest education level: None   Tobacco Use   • Smoking status: Never   • Smokeless tobacco: Never   Substance and Sexual Activity   • Alcohol use: Yes     Comment: rarely   • Drug use: Never   • Sexual activity: Defer       Family History:   Family History   Problem Relation Name Age of Onset   • No Known Problems Biological Mother     • No Known Problems Biological Father         Allergies: Monosodium glutamate, Oxycodone, and Latex    Home Medications:      Current Medications:  Current Outpatient Medications    Medication Sig Dispense Refill   • cholecalciferol, vitamin D3, (VITAMIN D3 ORAL) Take by mouth.     • DICLOFENAC POTASSIUM ORAL      • gabapentin (NEURONTIN) 300 mg capsule Take 300 mg by mouth 3 (three) times a day.    3   • omeprazole (PriLOSEC) 20 mg capsule Take 20 mg by mouth as needed.       • vitamin B complex (B COMPLEX ORAL) Take by mouth.     • ibuprofen (MOTRIN) 800 mg tablet every 8 hours.     • tiZANidine (ZANAFLEX) 2 mg tablet TAKE 1 TABLET BY MOUTH EVERY 6 HOURS AS NEEDED FOR MUSCLE SPASMS. (Patient not taking: No sig reported) 180 tablet 0   • ZIPSOR 25 mg capsule TAKE 2 CAPSULES BY MOUTH TWICE DAILY AFTER MEALS (Patient not taking: Reported on 6/20/2025)     • ZORVOLEX 35 mg capsule 35 mg 2 (two) times a day.   (Patient not taking: Reported on 6/20/2025)  0     No current facility-administered medications for this visit.         Physicial Exam    Vital Signs   Vitals:    06/24/25 1542   BP: (!) 142/88   Pulse: 89   Resp: 18   SpO2: 96%       Awake, alert, oriented to person, place, time and situation; speech and fund of knowledge normal. Head NC/AT.  PERRL, extra-ocular movements intact, face symmetric, tongue protrudes to midline, no nystagmus or diplopia.  Neck is supple, has full range of motion and there is no evidence of JVD.  There is no tenderness to palpation.  Breathing comfortably without distress, tachypnea, wheezing or use of accessory muscles.  Heart has a regular rate and rhythm. Abdomen ND.   Skin is warm, well perfused, with good capillary refill.  Limbs show no deformities or edema.    Neurological examination:    PERRL, extra-ocular movements intact, face symmetric, tongue protrudes to midline, no nystagmus or diplopia.  Motor:     RUE:  D  5/5, B  5/5, T 5/5, WE 5/5, HG 5/5, IH 5/5   LUE:  D  5/5, B  5/5, T 5/5, WE 5/5, HG 5/5, IH 5/5   RLE:  IP  5/5, Q  5/5, DF 5/5, EHL 5/5, PF 5/5   LLE:  IP  5/5, Q  5/5, DF 5/5, EHL 5/5, PF 5/5  Reflexes:  Normoreflexive, no Bess's or  Babinski signs, no clonus  Sensory exam:  Intact to light touch, pain, temperature and JPS testing  He cannot sit or  any one position for any period of time and needs to keep changing positions.    Assessment/Plan    This is a 54 yo M s/p prior left L4-5 decompression with new onset L > R L4 distribution pain and recurrent L L5 pain.  He is debilitated by pain.  He has had numerous injections with varying levels of relief.  He wishes to proceed with surgical intervention.  I offered him bilateral L3-4 hemilaminotomies and microdiscectomies and revision left L4-5 decompression and foraminotomy.  He understands the decision to pursue surgery comes down to an issue of quality of life as he is not losing neurological function that would warrant urgent surgical intervention.    We discussed the risks benefits and alternatives of surgery in detail.  The risks that we discussed include those of general anesthesia, medical complications, infection, blood loss possibly requiring transfusion, vascular or other organ injury, neurological worsening, failure to alleviate preoperative symptoms, failure to improve neurological loss if present, need for further surgery in the future either due to progressive disease or iatrogenic worsening, CSF leak (requiring bedrest, prolonged drainage, recurrent surgery, etc), recurrent herniated disc (10% at 10 years in longer studies).      He does wish to proceed with surgery.  All questions answered.  Thank you very much for the opportunity to be involved in the care of your patient.  Please call the office should any questions or problems arise.    Sincerely,       Jan Mari MD

## 2025-06-26 PROBLEM — M51.16 LUMBAR DISC HERNIATION WITH RADICULOPATHY: Status: ACTIVE | Noted: 2025-06-24

## 2025-07-01 ENCOUNTER — APPOINTMENT (OUTPATIENT)
Dept: LAB | Facility: HOSPITAL | Age: 55
End: 2025-07-01
Attending: NEUROLOGICAL SURGERY
Payer: OTHER MISCELLANEOUS

## 2025-07-01 ENCOUNTER — HOSPITAL ENCOUNTER (OUTPATIENT)
Dept: CARDIOLOGY | Facility: HOSPITAL | Age: 55
Discharge: HOME | End: 2025-07-01
Attending: NEUROLOGICAL SURGERY
Payer: OTHER MISCELLANEOUS

## 2025-07-01 DIAGNOSIS — M51.16 LUMBAR DISC HERNIATION WITH RADICULOPATHY: ICD-10-CM

## 2025-07-01 LAB
ABO + RH BLD: NORMAL
ALBUMIN SERPL-MCNC: 4.6 G/DL (ref 3.5–5.7)
ALP SERPL-CCNC: 81 IU/L (ref 34–125)
ALT SERPL-CCNC: 72 IU/L (ref 7–52)
ANION GAP SERPL CALC-SCNC: 9 MEQ/L (ref 3–15)
APTT PPP: 25 SEC (ref 23–35)
AST SERPL-CCNC: 41 IU/L (ref 13–39)
BACTERIA URNS QL MICRO: ABNORMAL /HPF
BASOPHILS # BLD: 0.08 K/UL (ref 0.01–0.1)
BASOPHILS NFR BLD: 0.5 %
BILIRUB SERPL-MCNC: 0.5 MG/DL (ref 0.3–1.2)
BILIRUB UR QL STRIP.AUTO: NEGATIVE MG/DL
BLD GP AB SCN SERPL QL: NEGATIVE
BLOOD BANK CMNT PATIENT-IMP: NORMAL
BUN SERPL-MCNC: 18 MG/DL (ref 7–25)
CALCIUM SERPL-MCNC: 9.6 MG/DL (ref 8.6–10.3)
CHLORIDE SERPL-SCNC: 100 MEQ/L (ref 98–107)
CLARITY UR REFRACT.AUTO: CLEAR
CO2 SERPL-SCNC: 26 MEQ/L (ref 21–31)
COLOR UR AUTO: YELLOW
CREAT SERPL-MCNC: 1 MG/DL (ref 0.7–1.3)
D AG BLD QL: POSITIVE
DIFFERENTIAL METHOD BLD: ABNORMAL
EGFRCR SERPLBLD CKD-EPI 2021: >60 ML/MIN/1.73M*2
EOSINOPHIL # BLD: 0.01 K/UL (ref 0.04–0.54)
EOSINOPHIL NFR BLD: 0.1 %
ERYTHROCYTE [DISTWIDTH] IN BLOOD BY AUTOMATED COUNT: 14.1 % (ref 11.6–14.4)
EST. AVERAGE GLUCOSE BLD GHB EST-MCNC: 140 MG/DL
GLUCOSE SERPL-MCNC: 116 MG/DL (ref 70–99)
GLUCOSE UR STRIP.AUTO-MCNC: NEGATIVE MG/DL
HBA1C MFR BLD: 6.5 %
HCT VFR BLD AUTO: 43.3 % (ref 40.1–51)
HGB BLD-MCNC: 14.6 G/DL (ref 13.7–17.5)
HGB UR QL STRIP.AUTO: NEGATIVE
HYALINE CASTS #/AREA URNS LPF: ABNORMAL /LPF
IMM GRANULOCYTES # BLD AUTO: 0.52 K/UL (ref 0–0.08)
IMM GRANULOCYTES NFR BLD AUTO: 3 %
INR PPP: 1
KETONES UR STRIP.AUTO-MCNC: NEGATIVE MG/DL
LABORATORY COMMENT REPORT: NORMAL
LEUKOCYTE ESTERASE UR QL STRIP.AUTO: NEGATIVE
LYMPHOCYTES # BLD: 2.49 K/UL (ref 1.2–3.5)
LYMPHOCYTES NFR BLD: 14.6 %
MCH RBC QN AUTO: 28.2 PG (ref 28–33.2)
MCHC RBC AUTO-ENTMCNC: 33.7 G/DL (ref 32.2–36.5)
MCV RBC AUTO: 83.8 FL (ref 83–98)
MONOCYTES # BLD: 1.35 K/UL (ref 0.3–1)
MONOCYTES NFR BLD: 7.9 %
MUCOUS THREADS URNS QL MICRO: ABNORMAL /LPF
NEUTROPHILS # BLD: 12.64 K/UL (ref 1.7–7)
NEUTS SEG NFR BLD: 73.9 %
NITRITE UR QL STRIP.AUTO: NEGATIVE
NRBC BLD-RTO: 0.1 %
PH UR STRIP.AUTO: 7.5 [PH]
PLATELET # BLD AUTO: 380 K/UL (ref 150–350)
PMV BLD AUTO: 10.4 FL (ref 9.4–12.4)
POTASSIUM SERPL-SCNC: 4.3 MEQ/L (ref 3.5–5.1)
PROT SERPL-MCNC: 7.7 G/DL (ref 6–8.2)
PROT UR QL STRIP.AUTO: ABNORMAL
PROTHROMBIN TIME: 13 SEC (ref 12.2–14.5)
RBC # BLD AUTO: 5.17 M/UL (ref 4.5–5.8)
RBC #/AREA URNS HPF: ABNORMAL /HPF
SODIUM SERPL-SCNC: 135 MEQ/L (ref 136–145)
SP GR UR REFRACT.AUTO: 1.03
SPECIMEN EXP DATE BLD: NORMAL
SQUAMOUS URNS QL MICRO: ABNORMAL /HPF
UROBILINOGEN UR STRIP-ACNC: 0.2 EU/DL
WBC # BLD AUTO: 17.09 K/UL (ref 3.8–10.5)
WBC #/AREA URNS HPF: ABNORMAL /HPF

## 2025-07-01 PROCEDURE — 81001 URINALYSIS AUTO W/SCOPE: CPT

## 2025-07-01 PROCEDURE — 85730 THROMBOPLASTIN TIME PARTIAL: CPT

## 2025-07-01 PROCEDURE — 83036 HEMOGLOBIN GLYCOSYLATED A1C: CPT

## 2025-07-01 PROCEDURE — 85610 PROTHROMBIN TIME: CPT

## 2025-07-01 PROCEDURE — 80053 COMPREHEN METABOLIC PANEL: CPT

## 2025-07-01 PROCEDURE — 36415 COLL VENOUS BLD VENIPUNCTURE: CPT

## 2025-07-01 PROCEDURE — 86901 BLOOD TYPING SEROLOGIC RH(D): CPT

## 2025-07-01 PROCEDURE — 85025 COMPLETE CBC W/AUTO DIFF WBC: CPT

## 2025-07-01 PROCEDURE — 93005 ELECTROCARDIOGRAM TRACING: CPT

## 2025-07-02 ENCOUNTER — PRE-ADMISSION TESTING (OUTPATIENT)
Dept: PREADMISSION TESTING | Age: 55
End: 2025-07-02
Payer: COMMERCIAL

## 2025-07-02 LAB
ATRIAL RATE: 77
P AXIS: 13
PR INTERVAL: 150
QRS DURATION: 102
QT INTERVAL: 360
QTC CALCULATION(BAZETT): 407
R AXIS: -1
T WAVE AXIS: 3
VENTRICULAR RATE: 77

## 2025-07-03 ENCOUNTER — PRE-ADMISSION TESTING (OUTPATIENT)
Dept: PREADMISSION TESTING | Age: 55
End: 2025-07-03
Payer: COMMERCIAL

## 2025-07-03 VITALS — HEIGHT: 66 IN | WEIGHT: 256 LBS | BODY MASS INDEX: 41.14 KG/M2

## 2025-07-03 ASSESSMENT — PAIN SCALES - GENERAL: PAINLEVEL_OUTOF10: 4

## 2025-07-03 NOTE — PRE-PROCEDURE INSTRUCTIONS
[address] [address]    1.       We will call you between 3 pm and 7 pm on July 8, 2025 to inform you of arrival time for your procedure. If you do not hear by 6PM, please call [phone] for arrival time.     2.        Please arrive through the Main Entrance of the [facility] (across from the parking garage) and report to the Surgery Registration Desk on the day of your procedure.    3. Please follow the following fasting guidelines:     No solid food EIGHT HOURS prior to arrival time on day of surgery.  6 ounces of clear liquids, meaning water or PLAIN black coffee WITHOUT any milk, cream, sugar, or sweetener are permitted up to TWO HOURS prior to arrival at the hospital.    4. Please take ONLY the following medications with a sip of water on the morning of your procedure: (populate names and/or NONE) Gabapentin ( NEURONTIN), Omeprazole ( PRILOSEC)     No NSAIDS, Aspirin, Advil, Aleve, Diclofenac, Motrin, Ibuprofen, Herbal Supplements or Vitamins until after surgery. Tylenol is ok to take.      5. Other Instructions: You may brush your teeth the morning of the procedure. Rinse and spit, do not swallow.  Bring a list of your medications with dosages.  Use surgical wash as directed.     6. If you develop a cold, cough, fever, rash, or other symptom prior to the day of the procedure, please report it to your physician immediately.    7. If you need to cancel the procedure for any reason, please contact your physician.    8. Make arrangements to have safe transportation home accompanied by a responsible adult. If you have not arranged safe transportation home, your surgery will be cancelled. Safe transportation may include private vehicle, ride-share service, taxi and public transportation when accompanied by a responsible adult who will assist you home. A responsible adult is someone known to you and does not include the taxi, ride-share or public transit drive transporting  you.    9.  If it is medically necessary for you to have a longer stay, you will be informed as soon as the decision is made.    10. Only bring essential items to the hospital.  Do not wear or bring anything of value to the hospital including jewelry of any kind, money, or wallet. Do not wear make-up or contact lenses.  DO NOT BRING MEDICATIONS FROM HOME unless instructed to do so. DO bring your hearing aids, glasses, and a case    11. No lotion, creams, powders, or oils on skin the morning of procedure     12. Dress in comfortable clothes.    13.  If instructed, please bring a copy of your Advanced Directive (Living Will/Durable Power of ) on the day of your procedure.     14. Ensuring your safety at all times is a very important part of our [facility] Culture of Safety. After having surgery and sedation, you are at risk for falling and balance issues. Although you may feel awake, the effects of the medication can last up to 24 hours after anesthesia. If you need to use the bathroom during your recovery period, nursing staff will escort you there and stay with you to ensure your safety.    15. Refrain from drinking alcohol and smoking cigarettes for 24 hours prior to surgery.    16.  If your procedure indicates the need for CHG antiseptic wash (Bactoshield or Hibiclens), please use this instead and follow instructions as discussed at the time of your Pre-Admission Testing phone interview or visit.    Above instructions reviewed with patient and patient acknowledges understanding.    Form explained by: Marah Arceo RN

## 2025-08-04 ENCOUNTER — TRANSCRIBE ORDERS (OUTPATIENT)
Dept: NEUROSURGERY | Facility: CLINIC | Age: 55
End: 2025-08-04
Payer: COMMERCIAL

## 2025-08-04 ENCOUNTER — APPOINTMENT (OUTPATIENT)
Dept: LAB | Facility: HOSPITAL | Age: 55
End: 2025-08-04
Attending: PODIATRIST
Payer: OTHER MISCELLANEOUS

## 2025-08-04 ENCOUNTER — APPOINTMENT (OUTPATIENT)
Dept: LAB | Facility: HOSPITAL | Age: 55
End: 2025-08-04
Attending: PHYSICIAN ASSISTANT
Payer: OTHER MISCELLANEOUS

## 2025-08-04 DIAGNOSIS — G60.3 IDIOPATHIC PROGRESSIVE POLYNEUROPATHY: ICD-10-CM

## 2025-08-04 DIAGNOSIS — M51.16 LUMBAR DISC HERNIATION WITH RADICULOPATHY: ICD-10-CM

## 2025-08-04 DIAGNOSIS — E55.9 AVITAMINOSIS D: ICD-10-CM

## 2025-08-04 DIAGNOSIS — E03.9 MYXEDEMA HEART DISEASE: ICD-10-CM

## 2025-08-04 DIAGNOSIS — M10.071 ACUTE IDIOPATHIC GOUT OF RIGHT FOOT: ICD-10-CM

## 2025-08-04 DIAGNOSIS — I51.9 MYXEDEMA HEART DISEASE: ICD-10-CM

## 2025-08-04 DIAGNOSIS — R73.09 IMPAIRED GLUCOSE TOLERANCE TEST: ICD-10-CM

## 2025-08-04 DIAGNOSIS — Z01.818 PRE-OPERATIVE CLEARANCE: Primary | ICD-10-CM

## 2025-08-04 DIAGNOSIS — E53.8 BIOTIN-(PROPIONYL-COA-CARBOXYLASE) LIGASE DEFICIENCY: ICD-10-CM

## 2025-08-04 DIAGNOSIS — Z01.818 PRE-OPERATIVE CLEARANCE: ICD-10-CM

## 2025-08-04 DIAGNOSIS — E78.2 MIXED HYPERLIPIDEMIA: ICD-10-CM

## 2025-08-04 LAB
25(OH)D3 SERPL-MCNC: 40 NG/ML (ref 30–100)
ABO + RH BLD: NORMAL
ALBUMIN SERPL-MCNC: 4.7 G/DL (ref 3.5–5.7)
ALP SERPL-CCNC: 85 IU/L (ref 34–125)
ALT SERPL-CCNC: 49 IU/L (ref 7–52)
ANION GAP SERPL CALC-SCNC: 9 MEQ/L (ref 3–15)
APTT PPP: 28 SEC (ref 23–35)
AST SERPL-CCNC: 38 IU/L (ref 13–39)
BASOPHILS # BLD: 0.06 K/UL (ref 0.01–0.1)
BASOPHILS NFR BLD: 0.7 %
BILIRUB SERPL-MCNC: 0.6 MG/DL (ref 0.3–1.2)
BILIRUB UR QL STRIP.AUTO: NEGATIVE MG/DL
BLD GP AB SCN SERPL QL: NEGATIVE
BLOOD BANK CMNT PATIENT-IMP: NORMAL
BUN SERPL-MCNC: 10 MG/DL (ref 7–25)
CALCIUM SERPL-MCNC: 10 MG/DL (ref 8.6–10.3)
CHLORIDE SERPL-SCNC: 100 MEQ/L (ref 98–107)
CHOLEST SERPL-MCNC: 186 MG/DL
CLARITY UR REFRACT.AUTO: CLEAR
CO2 SERPL-SCNC: 30 MEQ/L (ref 21–31)
COLOR UR AUTO: YELLOW
CREAT SERPL-MCNC: 1 MG/DL (ref 0.7–1.3)
D AG BLD QL: POSITIVE
DIFFERENTIAL METHOD BLD: NORMAL
EGFRCR SERPLBLD CKD-EPI 2021: >60 ML/MIN/1.73M*2
EOSINOPHIL # BLD: 0.32 K/UL (ref 0.04–0.54)
EOSINOPHIL NFR BLD: 4 %
ERYTHROCYTE [DISTWIDTH] IN BLOOD BY AUTOMATED COUNT: 13.7 % (ref 11.6–14.4)
EST. AVERAGE GLUCOSE BLD GHB EST-MCNC: 140 MG/DL
FOLATE SERPL-MCNC: >20 NG/ML
FT4I SERPL CALC-MCNC: 2.79 (ref 2.1–3.8)
GLUCOSE P FAST SERPL-MCNC: 94 MG/DL (ref 70–99)
GLUCOSE SERPL-MCNC: 93 MG/DL (ref 70–99)
GLUCOSE UR STRIP.AUTO-MCNC: NEGATIVE MG/DL
HBA1C MFR BLD: 6.5 %
HCT VFR BLD AUTO: 44 % (ref 40.1–51)
HDLC SERPL-MCNC: 40 MG/DL
HDLC SERPL: 4.7 {RATIO}
HGB BLD-MCNC: 14.9 G/DL (ref 13.7–17.5)
HGB UR QL STRIP.AUTO: NEGATIVE
IMM GRANULOCYTES # BLD AUTO: 0.06 K/UL (ref 0–0.08)
IMM GRANULOCYTES NFR BLD AUTO: 0.7 %
INR PPP: 1
KETONES UR STRIP.AUTO-MCNC: NEGATIVE MG/DL
LABORATORY COMMENT REPORT: NORMAL
LDLC SERPL CALC-MCNC: 114 MG/DL
LEUKOCYTE ESTERASE UR QL STRIP.AUTO: NEGATIVE
LYMPHOCYTES # BLD: 2.81 K/UL (ref 1.2–3.5)
LYMPHOCYTES NFR BLD: 35 %
MCH RBC QN AUTO: 28.1 PG (ref 28–33.2)
MCHC RBC AUTO-ENTMCNC: 33.9 G/DL (ref 32.2–36.5)
MCV RBC AUTO: 83 FL (ref 83–98)
MONOCYTES # BLD: 0.79 K/UL (ref 0.3–1)
MONOCYTES NFR BLD: 9.8 %
NEUTROPHILS # BLD: 3.99 K/UL (ref 1.7–7)
NEUTS SEG NFR BLD: 49.8 %
NITRITE UR QL STRIP.AUTO: NEGATIVE
NONHDLC SERPL-MCNC: 146 MG/DL
NRBC BLD-RTO: 0 %
PH UR STRIP.AUTO: 6.5 [PH]
PLATELET # BLD AUTO: 336 K/UL (ref 150–350)
PMV BLD AUTO: 10.4 FL (ref 9.4–12.4)
POTASSIUM SERPL-SCNC: 4.3 MEQ/L (ref 3.5–5.1)
PROT SERPL-MCNC: 8 G/DL (ref 6–8.2)
PROT UR QL STRIP.AUTO: NEGATIVE
PROTHROMBIN TIME: 13.2 SEC (ref 12.2–14.5)
RBC # BLD AUTO: 5.3 M/UL (ref 4.5–5.8)
SODIUM SERPL-SCNC: 139 MEQ/L (ref 136–145)
SP GR UR REFRACT.AUTO: 1.01
SPECIMEN EXP DATE BLD: NORMAL
T3RU NFR SERPL: 39.9 % (ref 32–48.4)
T4 FREE SERPL-MCNC: 0.71 NG/DL (ref 0.58–1.64)
TRIGL SERPL-MCNC: 158 MG/DL
TSH SERPL DL<=0.05 MIU/L-ACNC: 1.32 MIU/L (ref 0.34–5.6)
URATE SERPL-MCNC: 7.8 MG/DL (ref 4.4–7.6)
UROBILINOGEN UR STRIP-ACNC: 0.2 EU/DL
VIT B12 SERPL-MCNC: 575 PG/ML (ref 180–914)
WBC # BLD AUTO: 8.03 K/UL (ref 3.8–10.5)

## 2025-08-04 PROCEDURE — 81003 URINALYSIS AUTO W/O SCOPE: CPT

## 2025-08-04 PROCEDURE — 85025 COMPLETE CBC W/AUTO DIFF WBC: CPT

## 2025-08-04 PROCEDURE — 84443 ASSAY THYROID STIM HORMONE: CPT

## 2025-08-04 PROCEDURE — 86901 BLOOD TYPING SEROLOGIC RH(D): CPT

## 2025-08-04 PROCEDURE — 82607 VITAMIN B-12: CPT

## 2025-08-04 PROCEDURE — 85610 PROTHROMBIN TIME: CPT

## 2025-08-04 PROCEDURE — 82947 ASSAY GLUCOSE BLOOD QUANT: CPT

## 2025-08-04 PROCEDURE — 84550 ASSAY OF BLOOD/URIC ACID: CPT

## 2025-08-04 PROCEDURE — 84479 ASSAY OF THYROID (T3 OR T4): CPT

## 2025-08-04 PROCEDURE — 82306 VITAMIN D 25 HYDROXY: CPT

## 2025-08-04 PROCEDURE — 80061 LIPID PANEL: CPT

## 2025-08-04 PROCEDURE — 84425 ASSAY OF VITAMIN B-1: CPT

## 2025-08-04 PROCEDURE — 85730 THROMBOPLASTIN TIME PARTIAL: CPT

## 2025-08-04 PROCEDURE — 82746 ASSAY OF FOLIC ACID SERUM: CPT

## 2025-08-04 PROCEDURE — 36415 COLL VENOUS BLD VENIPUNCTURE: CPT

## 2025-08-04 PROCEDURE — 83036 HEMOGLOBIN GLYCOSYLATED A1C: CPT

## 2025-08-04 PROCEDURE — 84439 ASSAY OF FREE THYROXINE: CPT

## 2025-08-07 ENCOUNTER — ANESTHESIA EVENT (OUTPATIENT)
Dept: OPERATING ROOM | Facility: HOSPITAL | Age: 55
Setting detail: HOSPITAL OUTPATIENT SURGERY
End: 2025-08-07
Payer: OTHER MISCELLANEOUS

## 2025-08-09 LAB — VIT B1 SERPL-SCNC: 14 NMOL/L (ref 8–30)

## 2025-08-12 PROCEDURE — 200200 PR NO CHARGE: Performed by: NEUROLOGICAL SURGERY

## 2025-08-13 ENCOUNTER — ANESTHESIA (OUTPATIENT)
Dept: OPERATING ROOM | Facility: HOSPITAL | Age: 55
Setting detail: HOSPITAL OUTPATIENT SURGERY
End: 2025-08-13
Payer: OTHER MISCELLANEOUS

## 2025-08-13 ENCOUNTER — HOSPITAL ENCOUNTER (OUTPATIENT)
Facility: HOSPITAL | Age: 55
Discharge: HOME | End: 2025-08-14
Attending: NEUROLOGICAL SURGERY | Admitting: NEUROLOGICAL SURGERY
Payer: OTHER MISCELLANEOUS

## 2025-08-13 ENCOUNTER — APPOINTMENT (OUTPATIENT)
Dept: RADIOLOGY | Facility: HOSPITAL | Age: 55
End: 2025-08-13
Attending: NEUROLOGICAL SURGERY
Payer: OTHER MISCELLANEOUS

## 2025-08-13 DIAGNOSIS — Z98.890 STATUS POST LUMBAR MICRODISCECTOMY: ICD-10-CM

## 2025-08-13 DIAGNOSIS — M51.16 LUMBAR DISC HERNIATION WITH RADICULOPATHY: Primary | ICD-10-CM

## 2025-08-13 PROCEDURE — 63600000 HC DRUGS/DETAIL CODE: Mod: JZ | Performed by: INTERNAL MEDICINE

## 2025-08-13 PROCEDURE — 25000000 HC PHARMACY GENERAL: Performed by: NEUROLOGICAL SURGERY

## 2025-08-13 PROCEDURE — 63600000 HC DRUGS/DETAIL CODE: Mod: JZ | Performed by: NEUROLOGICAL SURGERY

## 2025-08-13 PROCEDURE — 71000011 HC PACU PHASE 1 EA ADDL MIN: Performed by: NEUROLOGICAL SURGERY

## 2025-08-13 PROCEDURE — 25800000 HC PHARMACY IV SOLUTIONS: Performed by: REGISTERED NURSE

## 2025-08-13 PROCEDURE — 63042 LAMINOTOMY SINGLE LUMBAR: CPT | Mod: XS,LT | Performed by: NEUROLOGICAL SURGERY

## 2025-08-13 PROCEDURE — 25800000 HC PHARMACY IV SOLUTIONS: Performed by: NEUROLOGICAL SURGERY

## 2025-08-13 PROCEDURE — 63700000 HC SELF-ADMINISTRABLE DRUG: Performed by: NEUROLOGICAL SURGERY

## 2025-08-13 PROCEDURE — 71000001 HC PACU PHASE 1 INITIAL 30MIN: Performed by: NEUROLOGICAL SURGERY

## 2025-08-13 PROCEDURE — 97116 GAIT TRAINING THERAPY: CPT | Mod: GP

## 2025-08-13 PROCEDURE — 63600000 HC DRUGS/DETAIL CODE: Performed by: NEUROLOGICAL SURGERY

## 2025-08-13 PROCEDURE — 27200000 HC STERILE SUPPLY: Performed by: NEUROLOGICAL SURGERY

## 2025-08-13 PROCEDURE — 200200 PR NO CHARGE: Performed by: NEUROLOGICAL SURGERY

## 2025-08-13 PROCEDURE — 63600000 HC DRUGS/DETAIL CODE: Mod: JZ,TB | Performed by: NEUROLOGICAL SURGERY

## 2025-08-13 PROCEDURE — 36000014 HC OR LEVEL 4 EA ADDL MIN: Performed by: NEUROLOGICAL SURGERY

## 2025-08-13 PROCEDURE — 97162 PT EVAL MOD COMPLEX 30 MIN: CPT | Mod: GP

## 2025-08-13 PROCEDURE — 63600000 HC DRUGS/DETAIL CODE: Mod: JZ | Performed by: REGISTERED NURSE

## 2025-08-13 PROCEDURE — 63700000 HC SELF-ADMINISTRABLE DRUG: Performed by: INTERNAL MEDICINE

## 2025-08-13 PROCEDURE — 36000004 HC OR LEVEL 4 INITIAL 30MIN: Performed by: NEUROLOGICAL SURGERY

## 2025-08-13 PROCEDURE — 63030 LAMOT DCMPRN NRV RT 1 LMBR: CPT | Mod: 50 | Performed by: NEUROLOGICAL SURGERY

## 2025-08-13 PROCEDURE — 37000001 HC ANESTHESIA GENERAL: Performed by: NEUROLOGICAL SURGERY

## 2025-08-13 PROCEDURE — 25000000 HC PHARMACY GENERAL: Performed by: REGISTERED NURSE

## 2025-08-13 RX ORDER — BUPIVACAINE HYDROCHLORIDE AND EPINEPHRINE 5; 5 MG/ML; UG/ML
INJECTION, SOLUTION EPIDURAL; INTRACAUDAL; PERINEURAL
Status: DISCONTINUED | OUTPATIENT
Start: 2025-08-13 | End: 2025-08-13 | Stop reason: HOSPADM

## 2025-08-13 RX ORDER — PHENYLEPHRINE HCL IN 0.9% NACL 1 MG/10 ML
SYRINGE (ML) INTRAVENOUS AS NEEDED
Status: DISCONTINUED | OUTPATIENT
Start: 2025-08-13 | End: 2025-08-13 | Stop reason: SURG

## 2025-08-13 RX ORDER — HYDROMORPHONE HYDROCHLORIDE 1 MG/ML
1 INJECTION, SOLUTION INTRAMUSCULAR; INTRAVENOUS; SUBCUTANEOUS EVERY 2 HOUR PRN
Status: DISCONTINUED | OUTPATIENT
Start: 2025-08-13 | End: 2025-08-14 | Stop reason: HOSPADM

## 2025-08-13 RX ORDER — HYDROMORPHONE HYDROCHLORIDE 1 MG/ML
INJECTION, SOLUTION INTRAMUSCULAR; INTRAVENOUS; SUBCUTANEOUS AS NEEDED
Status: DISCONTINUED | OUTPATIENT
Start: 2025-08-13 | End: 2025-08-13 | Stop reason: SURG

## 2025-08-13 RX ORDER — DEXTROSE 40 %
15-30 GEL (GRAM) ORAL AS NEEDED
Status: DISCONTINUED | OUTPATIENT
Start: 2025-08-13 | End: 2025-08-13 | Stop reason: HOSPADM

## 2025-08-13 RX ORDER — KETOROLAC TROMETHAMINE 30 MG/ML
30 INJECTION, SOLUTION INTRAMUSCULAR; INTRAVENOUS EVERY 8 HOURS
Status: DISCONTINUED | OUTPATIENT
Start: 2025-08-13 | End: 2025-08-14 | Stop reason: HOSPADM

## 2025-08-13 RX ORDER — PANTOPRAZOLE SODIUM 40 MG/1
40 TABLET, DELAYED RELEASE ORAL DAILY
Status: DISCONTINUED | OUTPATIENT
Start: 2025-08-13 | End: 2025-08-14 | Stop reason: HOSPADM

## 2025-08-13 RX ORDER — FENTANYL CITRATE 50 UG/ML
INJECTION, SOLUTION INTRAMUSCULAR; INTRAVENOUS AS NEEDED
Status: DISCONTINUED | OUTPATIENT
Start: 2025-08-13 | End: 2025-08-13 | Stop reason: SURG

## 2025-08-13 RX ORDER — PROPOFOL 10 MG/ML
INJECTION, EMULSION INTRAVENOUS AS NEEDED
Status: DISCONTINUED | OUTPATIENT
Start: 2025-08-13 | End: 2025-08-13 | Stop reason: SURG

## 2025-08-13 RX ORDER — CEFAZOLIN SODIUM 1 G/3ML
INJECTION, POWDER, FOR SOLUTION INTRAMUSCULAR; INTRAVENOUS AS NEEDED
Status: DISCONTINUED | OUTPATIENT
Start: 2025-08-13 | End: 2025-08-13 | Stop reason: SURG

## 2025-08-13 RX ORDER — PHENYLEPHRINE HYDROCHLORIDE 10 MG/ML
INJECTION INTRAVENOUS CONTINUOUS PRN
Status: DISCONTINUED | OUTPATIENT
Start: 2025-08-13 | End: 2025-08-13 | Stop reason: SURG

## 2025-08-13 RX ORDER — KETOROLAC TROMETHAMINE 15 MG/ML
INJECTION, SOLUTION INTRAMUSCULAR; INTRAVENOUS AS NEEDED
Status: DISCONTINUED | OUTPATIENT
Start: 2025-08-13 | End: 2025-08-13 | Stop reason: SURG

## 2025-08-13 RX ORDER — HYDROMORPHONE HYDROCHLORIDE 1 MG/ML
INJECTION, SOLUTION INTRAMUSCULAR; INTRAVENOUS; SUBCUTANEOUS AS NEEDED
Status: DISCONTINUED | OUTPATIENT
Start: 2025-08-13 | End: 2025-08-13

## 2025-08-13 RX ORDER — SODIUM CHLORIDE 9 MG/ML
INJECTION, SOLUTION INTRAVENOUS CONTINUOUS PRN
Status: DISCONTINUED | OUTPATIENT
Start: 2025-08-13 | End: 2025-08-13 | Stop reason: SURG

## 2025-08-13 RX ORDER — ONDANSETRON HYDROCHLORIDE 2 MG/ML
INJECTION, SOLUTION INTRAVENOUS AS NEEDED
Status: DISCONTINUED | OUTPATIENT
Start: 2025-08-13 | End: 2025-08-13 | Stop reason: SURG

## 2025-08-13 RX ORDER — ONDANSETRON HYDROCHLORIDE 2 MG/ML
4 INJECTION, SOLUTION INTRAVENOUS EVERY 6 HOURS PRN
Status: DISCONTINUED | OUTPATIENT
Start: 2025-08-13 | End: 2025-08-14 | Stop reason: HOSPADM

## 2025-08-13 RX ORDER — HYDROMORPHONE HYDROCHLORIDE 4 MG/1
4 TABLET ORAL
Status: DISCONTINUED | OUTPATIENT
Start: 2025-08-13 | End: 2025-08-14 | Stop reason: HOSPADM

## 2025-08-13 RX ORDER — DEXAMETHASONE SODIUM PHOSPHATE 4 MG/ML
4 INJECTION, SOLUTION INTRA-ARTICULAR; INTRALESIONAL; INTRAMUSCULAR; INTRAVENOUS; SOFT TISSUE
Status: DISCONTINUED | OUTPATIENT
Start: 2025-08-13 | End: 2025-08-14 | Stop reason: HOSPADM

## 2025-08-13 RX ORDER — GLYCOPYRROLATE 0.6MG/3ML
SYRINGE (ML) INTRAVENOUS AS NEEDED
Status: DISCONTINUED | OUTPATIENT
Start: 2025-08-13 | End: 2025-08-13 | Stop reason: SURG

## 2025-08-13 RX ORDER — ADHESIVE BANDAGE 7/8"
15-30 BANDAGE TOPICAL AS NEEDED
Status: DISCONTINUED | OUTPATIENT
Start: 2025-08-13 | End: 2025-08-13 | Stop reason: HOSPADM

## 2025-08-13 RX ORDER — CHOLECALCIFEROL (VITAMIN D3) 25 MCG
62.5 TABLET ORAL DAILY
Status: DISCONTINUED | OUTPATIENT
Start: 2025-08-13 | End: 2025-08-14 | Stop reason: HOSPADM

## 2025-08-13 RX ORDER — GABAPENTIN 400 MG/1
400 CAPSULE ORAL 3 TIMES DAILY
Status: DISCONTINUED | OUTPATIENT
Start: 2025-08-13 | End: 2025-08-14 | Stop reason: HOSPADM

## 2025-08-13 RX ORDER — DEXTROSE 50 % IN WATER (D50W) INTRAVENOUS SYRINGE
25 AS NEEDED
Status: DISCONTINUED | OUTPATIENT
Start: 2025-08-13 | End: 2025-08-13 | Stop reason: HOSPADM

## 2025-08-13 RX ORDER — ACETAMINOPHEN 325 MG/1
650 TABLET ORAL ONCE
Status: COMPLETED | OUTPATIENT
Start: 2025-08-13 | End: 2025-08-13

## 2025-08-13 RX ORDER — FAMOTIDINE 10 MG/ML
INJECTION, SOLUTION INTRAVENOUS AS NEEDED
Status: DISCONTINUED | OUTPATIENT
Start: 2025-08-13 | End: 2025-08-13 | Stop reason: SURG

## 2025-08-13 RX ORDER — SODIUM CHLORIDE, SODIUM GLUCONATE, SODIUM ACETATE, POTASSIUM CHLORIDE AND MAGNESIUM CHLORIDE 30; 37; 368; 526; 502 MG/100ML; MG/100ML; MG/100ML; MG/100ML; MG/100ML
INJECTION, SOLUTION INTRAVENOUS CONTINUOUS PRN
Status: DISCONTINUED | OUTPATIENT
Start: 2025-08-13 | End: 2025-08-13 | Stop reason: SURG

## 2025-08-13 RX ORDER — DIPHENHYDRAMINE HCL 50 MG/ML
25 VIAL (ML) INJECTION EVERY 6 HOURS PRN
Status: DISCONTINUED | OUTPATIENT
Start: 2025-08-13 | End: 2025-08-14 | Stop reason: HOSPADM

## 2025-08-13 RX ORDER — POLYETHYLENE GLYCOL 3350 17 G/17G
17 POWDER, FOR SOLUTION ORAL DAILY
Status: DISCONTINUED | OUTPATIENT
Start: 2025-08-13 | End: 2025-08-14 | Stop reason: HOSPADM

## 2025-08-13 RX ORDER — ONDANSETRON HYDROCHLORIDE 2 MG/ML
4 INJECTION, SOLUTION INTRAVENOUS
Status: DISCONTINUED | OUTPATIENT
Start: 2025-08-13 | End: 2025-08-13 | Stop reason: HOSPADM

## 2025-08-13 RX ORDER — DEXAMETHASONE SODIUM PHOSPHATE 4 MG/ML
10 INJECTION, SOLUTION INTRA-ARTICULAR; INTRALESIONAL; INTRAMUSCULAR; INTRAVENOUS; SOFT TISSUE ONCE
Status: COMPLETED | OUTPATIENT
Start: 2025-08-13 | End: 2025-08-13

## 2025-08-13 RX ORDER — CETIRIZINE HYDROCHLORIDE 10 MG/1
10 TABLET ORAL NIGHTLY
Status: DISCONTINUED | OUTPATIENT
Start: 2025-08-14 | End: 2025-08-14 | Stop reason: HOSPADM

## 2025-08-13 RX ORDER — METOCLOPRAMIDE HYDROCHLORIDE 5 MG/ML
10 INJECTION INTRAMUSCULAR; INTRAVENOUS
Status: DISCONTINUED | OUTPATIENT
Start: 2025-08-13 | End: 2025-08-13 | Stop reason: HOSPADM

## 2025-08-13 RX ORDER — ROCURONIUM BROMIDE 50 MG/5 ML
SYRINGE (ML) INTRAVENOUS AS NEEDED
Status: DISCONTINUED | OUTPATIENT
Start: 2025-08-13 | End: 2025-08-13 | Stop reason: SURG

## 2025-08-13 RX ORDER — LANOLIN ALCOHOL/MO/W.PET/CERES
400 CREAM (GRAM) TOPICAL DAILY
Status: DISCONTINUED | OUTPATIENT
Start: 2025-08-13 | End: 2025-08-14 | Stop reason: HOSPADM

## 2025-08-13 RX ORDER — DOCUSATE SODIUM 100 MG/1
100 CAPSULE, LIQUID FILLED ORAL 2 TIMES DAILY
Status: DISCONTINUED | OUTPATIENT
Start: 2025-08-13 | End: 2025-08-14 | Stop reason: HOSPADM

## 2025-08-13 RX ORDER — FENTANYL CITRATE 50 UG/ML
50 INJECTION, SOLUTION INTRAMUSCULAR; INTRAVENOUS EVERY 5 MIN PRN
Status: COMPLETED | OUTPATIENT
Start: 2025-08-13 | End: 2025-08-13

## 2025-08-13 RX ORDER — BISACODYL 5 MG
5 TABLET, DELAYED RELEASE (ENTERIC COATED) ORAL 2 TIMES DAILY
Status: DISCONTINUED | OUTPATIENT
Start: 2025-08-13 | End: 2025-08-14 | Stop reason: HOSPADM

## 2025-08-13 RX ORDER — METHYLPREDNISOLONE ACETATE 40 MG/ML
INJECTION, SUSPENSION INTRA-ARTICULAR; INTRALESIONAL; INTRAMUSCULAR; SOFT TISSUE
Status: DISCONTINUED | OUTPATIENT
Start: 2025-08-13 | End: 2025-08-13 | Stop reason: HOSPADM

## 2025-08-13 RX ORDER — HYDROMORPHONE HYDROCHLORIDE 1 MG/ML
0.5 INJECTION, SOLUTION INTRAMUSCULAR; INTRAVENOUS; SUBCUTANEOUS
Status: DISCONTINUED | OUTPATIENT
Start: 2025-08-13 | End: 2025-08-13 | Stop reason: HOSPADM

## 2025-08-13 RX ORDER — MIDAZOLAM HYDROCHLORIDE 2 MG/2ML
INJECTION, SOLUTION INTRAMUSCULAR; INTRAVENOUS AS NEEDED
Status: DISCONTINUED | OUTPATIENT
Start: 2025-08-13 | End: 2025-08-13 | Stop reason: SURG

## 2025-08-13 RX ORDER — LIDOCAINE HYDROCHLORIDE 10 MG/ML
INJECTION, SOLUTION INFILTRATION; PERINEURAL AS NEEDED
Status: DISCONTINUED | OUTPATIENT
Start: 2025-08-13 | End: 2025-08-13 | Stop reason: SURG

## 2025-08-13 RX ORDER — DIAZEPAM 5 MG/1
5 TABLET ORAL EVERY 6 HOURS PRN
Status: DISCONTINUED | OUTPATIENT
Start: 2025-08-13 | End: 2025-08-14 | Stop reason: HOSPADM

## 2025-08-13 RX ORDER — ADHESIVE BANDAGE
30 BANDAGE TOPICAL 2 TIMES DAILY PRN
Status: DISCONTINUED | OUTPATIENT
Start: 2025-08-13 | End: 2025-08-14 | Stop reason: HOSPADM

## 2025-08-13 RX ADMIN — KETOROLAC TROMETHAMINE 30 MG: 30 INJECTION, SOLUTION INTRAMUSCULAR at 14:51

## 2025-08-13 RX ADMIN — Medication 100 MCG: at 07:41

## 2025-08-13 RX ADMIN — DEXAMETHASONE SODIUM PHOSPHATE 4 MG: 4 INJECTION, SOLUTION INTRA-ARTICULAR; INTRALESIONAL; INTRAMUSCULAR; INTRAVENOUS; SOFT TISSUE at 19:55

## 2025-08-13 RX ADMIN — Medication 400 MG: at 16:42

## 2025-08-13 RX ADMIN — CEFAZOLIN 2 G: 2 INJECTION, POWDER, FOR SOLUTION INTRAMUSCULAR; INTRAVENOUS at 22:59

## 2025-08-13 RX ADMIN — FENTANYL CITRATE 100 MCG: 50 INJECTION INTRAMUSCULAR; INTRAVENOUS at 07:34

## 2025-08-13 RX ADMIN — Medication 50 MG: at 08:03

## 2025-08-13 RX ADMIN — Medication 62.5 MCG: at 16:41

## 2025-08-13 RX ADMIN — Medication 200 MCG: at 08:34

## 2025-08-13 RX ADMIN — ONDANSETRON 4 MG: 2 INJECTION INTRAMUSCULAR; INTRAVENOUS at 09:26

## 2025-08-13 RX ADMIN — MIDAZOLAM 2 MG: 1 INJECTION INTRAMUSCULAR; INTRAVENOUS at 07:30

## 2025-08-13 RX ADMIN — LIDOCAINE HYDROCHLORIDE 5 ML: 10 INJECTION, SOLUTION EPIDURAL; INFILTRATION; INTRACAUDAL at 07:34

## 2025-08-13 RX ADMIN — DIAZEPAM 5 MG: 5 TABLET ORAL at 11:02

## 2025-08-13 RX ADMIN — GABAPENTIN 400 MG: 400 CAPSULE ORAL at 19:55

## 2025-08-13 RX ADMIN — Medication 100 MCG: at 07:59

## 2025-08-13 RX ADMIN — FENTANYL CITRATE 50 MCG: 50 INJECTION INTRAMUSCULAR; INTRAVENOUS at 10:11

## 2025-08-13 RX ADMIN — Medication 200 MCG: at 08:13

## 2025-08-13 RX ADMIN — PHENYLEPHRINE HYDROCHLORIDE 25 MCG/MIN: 10 INJECTION INTRAVENOUS at 07:59

## 2025-08-13 RX ADMIN — ACETAMINOPHEN 650 MG: 325 TABLET ORAL at 11:02

## 2025-08-13 RX ADMIN — HYDROMORPHONE HYDROCHLORIDE 0.5 MG: 1 INJECTION, SOLUTION INTRAMUSCULAR; INTRAVENOUS; SUBCUTANEOUS at 10:59

## 2025-08-13 RX ADMIN — Medication 50 MG: at 07:34

## 2025-08-13 RX ADMIN — KETOROLAC TROMETHAMINE 15 MG: 15 INJECTION, SOLUTION INTRAMUSCULAR; INTRAVENOUS at 09:32

## 2025-08-13 RX ADMIN — GLYCOPYRROLATE 0.2 MG: 0.2 INJECTION INTRAMUSCULAR; INTRAVENOUS at 09:32

## 2025-08-13 RX ADMIN — CEFAZOLIN 3 G: 2 INJECTION, POWDER, FOR SOLUTION INTRAMUSCULAR; INTRAVENOUS at 07:40

## 2025-08-13 RX ADMIN — DEXAMETHASONE SODIUM PHOSPHATE 10 MG: 4 INJECTION, SOLUTION INTRA-ARTICULAR; INTRALESIONAL; INTRAMUSCULAR; INTRAVENOUS; SOFT TISSUE at 07:51

## 2025-08-13 RX ADMIN — PROPOFOL 200 MG: 10 INJECTION, EMULSION INTRAVENOUS at 07:34

## 2025-08-13 RX ADMIN — FAMOTIDINE 20 MG: 10 INJECTION, SOLUTION INTRAVENOUS at 08:02

## 2025-08-13 RX ADMIN — SODIUM CHLORIDE: 9 INJECTION, SOLUTION INTRAVENOUS at 07:26

## 2025-08-13 RX ADMIN — DIAZEPAM 5 MG: 5 TABLET ORAL at 22:59

## 2025-08-13 RX ADMIN — KETOROLAC TROMETHAMINE 30 MG: 30 INJECTION, SOLUTION INTRAMUSCULAR at 22:59

## 2025-08-13 RX ADMIN — HYDROMORPHONE HYDROCHLORIDE 0.25 MG: 1 INJECTION, SOLUTION INTRAMUSCULAR; INTRAVENOUS; SUBCUTANEOUS at 08:50

## 2025-08-13 RX ADMIN — SODIUM CHLORIDE, SODIUM GLUCONATE, SODIUM ACETATE, POTASSIUM CHLORIDE AND MAGNESIUM CHLORIDE: 526; 502; 368; 37; 30 INJECTION, SOLUTION INTRAVENOUS at 08:44

## 2025-08-13 RX ADMIN — GABAPENTIN 400 MG: 400 CAPSULE ORAL at 14:51

## 2025-08-13 RX ADMIN — PHENYLEPHRINE HYDROCHLORIDE 50 MCG/MIN: 10 INJECTION INTRAVENOUS at 08:13

## 2025-08-13 RX ADMIN — CEFAZOLIN 2 G: 2 INJECTION, POWDER, FOR SOLUTION INTRAMUSCULAR; INTRAVENOUS at 16:44

## 2025-08-13 RX ADMIN — Medication 200 MCG: at 07:45

## 2025-08-13 RX ADMIN — FENTANYL CITRATE 50 MCG: 50 INJECTION INTRAMUSCULAR; INTRAVENOUS at 10:02

## 2025-08-13 RX ADMIN — SUGAMMADEX 400 MG: 100 INJECTION, SOLUTION INTRAVENOUS at 09:29

## 2025-08-13 RX ADMIN — PANTOPRAZOLE SODIUM 40 MG: 40 TABLET, DELAYED RELEASE ORAL at 16:42

## 2025-08-13 ASSESSMENT — COGNITIVE AND FUNCTIONAL STATUS - GENERAL
STANDING UP FROM CHAIR USING ARMS: 3 - A LITTLE
CLIMB 3 TO 5 STEPS WITH RAILING: 2 - A LOT
CLIMB 3 TO 5 STEPS WITH RAILING: 2 - A LOT
WALKING IN HOSPITAL ROOM: 3 - A LITTLE
MOVING TO AND FROM BED TO CHAIR: 3 - A LITTLE
STANDING UP FROM CHAIR USING ARMS: 3 - A LITTLE
WALKING IN HOSPITAL ROOM: 3 - A LITTLE
AFFECT: WFL
MOVING TO AND FROM BED TO CHAIR: 3 - A LITTLE
CLIMB 3 TO 5 STEPS WITH RAILING: 2 - A LOT
STANDING UP FROM CHAIR USING ARMS: 3 - A LITTLE
WALKING IN HOSPITAL ROOM: 3 - A LITTLE
MOVING TO AND FROM BED TO CHAIR: 3 - A LITTLE

## 2025-08-14 VITALS
SYSTOLIC BLOOD PRESSURE: 83 MMHG | HEART RATE: 70 BPM | OXYGEN SATURATION: 93 % | WEIGHT: 251 LBS | HEIGHT: 66 IN | TEMPERATURE: 97.4 F | DIASTOLIC BLOOD PRESSURE: 50 MMHG | RESPIRATION RATE: 18 BRPM | BODY MASS INDEX: 40.34 KG/M2

## 2025-08-14 PROCEDURE — 63700000 HC SELF-ADMINISTRABLE DRUG: Performed by: NEUROLOGICAL SURGERY

## 2025-08-14 PROCEDURE — 200200 PR NO CHARGE: Performed by: NEUROLOGICAL SURGERY

## 2025-08-14 PROCEDURE — 97530 THERAPEUTIC ACTIVITIES: CPT | Mod: GP

## 2025-08-14 PROCEDURE — 63600000 HC DRUGS/DETAIL CODE: Mod: JZ | Performed by: NEUROLOGICAL SURGERY

## 2025-08-14 RX ORDER — KETOROLAC TROMETHAMINE 10 MG/1
10 TABLET, FILM COATED ORAL EVERY 6 HOURS PRN
Qty: 16 TABLET | Refills: 0 | Status: SHIPPED | OUTPATIENT
Start: 2025-08-14 | End: 2025-08-18

## 2025-08-14 RX ORDER — DIAZEPAM 5 MG/1
5 TABLET ORAL EVERY 8 HOURS PRN
Qty: 30 TABLET | Refills: 0 | Status: SHIPPED | OUTPATIENT
Start: 2025-08-14 | End: 2025-08-28

## 2025-08-14 RX ADMIN — Medication 62.5 MCG: at 08:45

## 2025-08-14 RX ADMIN — Medication 400 MG: at 08:45

## 2025-08-14 RX ADMIN — GABAPENTIN 400 MG: 400 CAPSULE ORAL at 08:45

## 2025-08-14 RX ADMIN — DEXAMETHASONE SODIUM PHOSPHATE 4 MG: 4 INJECTION, SOLUTION INTRA-ARTICULAR; INTRALESIONAL; INTRAMUSCULAR; INTRAVENOUS; SOFT TISSUE at 08:45

## 2025-08-14 RX ADMIN — KETOROLAC TROMETHAMINE 30 MG: 30 INJECTION, SOLUTION INTRAMUSCULAR at 05:13

## 2025-08-14 ASSESSMENT — COGNITIVE AND FUNCTIONAL STATUS - GENERAL
CLIMB 3 TO 5 STEPS WITH RAILING: 2 - A LOT
WALKING IN HOSPITAL ROOM: 3 - A LITTLE
STANDING UP FROM CHAIR USING ARMS: 3 - A LITTLE
MOVING TO AND FROM BED TO CHAIR: 4 - NONE
WALKING IN HOSPITAL ROOM: 4 - NONE
STANDING UP FROM CHAIR USING ARMS: 4 - NONE
CLIMB 3 TO 5 STEPS WITH RAILING: 4 - NONE
MOVING TO AND FROM BED TO CHAIR: 3 - A LITTLE

## 2025-08-26 ENCOUNTER — OFFICE VISIT (OUTPATIENT)
Dept: NEUROSURGERY | Facility: CLINIC | Age: 55
End: 2025-08-26
Payer: COMMERCIAL

## 2025-08-26 VITALS
OXYGEN SATURATION: 98 % | HEIGHT: 66 IN | SYSTOLIC BLOOD PRESSURE: 122 MMHG | DIASTOLIC BLOOD PRESSURE: 84 MMHG | RESPIRATION RATE: 16 BRPM | HEART RATE: 91 BPM | WEIGHT: 251 LBS | TEMPERATURE: 98.3 F | BODY MASS INDEX: 40.34 KG/M2

## 2025-08-26 DIAGNOSIS — M54.16 ACUTE LUMBAR RADICULOPATHY: Primary | ICD-10-CM

## 2025-08-26 PROCEDURE — 99024 POSTOP FOLLOW-UP VISIT: CPT | Performed by: PHYSICIAN ASSISTANT

## 2025-08-26 RX ORDER — PREDNISONE 20 MG/1
TABLET ORAL
Qty: 19 TABLET | Refills: 0 | Status: SHIPPED | OUTPATIENT
Start: 2025-08-26

## 2025-08-26 RX ORDER — IBUPROFEN 200 MG
400 TABLET ORAL 2 TIMES DAILY
COMMUNITY

## 2025-08-26 ASSESSMENT — ENCOUNTER SYMPTOMS
ARTHRALGIAS: 1
WOUND: 0
DIAPHORESIS: 0
ACTIVITY CHANGE: 0
BACK PAIN: 1
CHILLS: 0
FATIGUE: 0
FEVER: 0

## (undated) DEVICE — PAD GROUND ELECTROSURGICAL W/CORD

## (undated) DEVICE — DRAIN  FLAT 10MM

## (undated) DEVICE — SYRINGE DISP LUER-LOK 20 CC

## (undated) DEVICE — SOLN DURAPREP WAND

## (undated) DEVICE — SOLN IRRIG STER WATER 1000ML

## (undated) DEVICE — SEALANT SURGICAL FLOSEAL 5ML STERILE PREP RECOTHERM

## (undated) DEVICE — STERISTRIP 1/2INX4IN

## (undated) DEVICE — SLEEVE SCD COMFORT KNEE LENGTH MED

## (undated) DEVICE — MANIFOLD FOUR PORT NEPTUNE

## (undated) DEVICE — APPLICATOR CHLORAPREP 26ML ORANGE TINT

## (undated) DEVICE — SPINE TABLE COVER ULTRA COMFORT MEDIUM

## (undated) DEVICE — BURR LONG MIDAS AM-8

## (undated) DEVICE — SUTURE POLYSORB 3-0 UNDYED 1X30 C-13

## (undated) DEVICE — Device

## (undated) DEVICE — SUTURE POLYSORB 2-0 UNDYED 5X18 GS-10 D-TACH

## (undated) DEVICE — TUBING SMOKE EVAC PENCIL COATED

## (undated) DEVICE — PACK RFID LAMINECTOMY PMH

## (undated) DEVICE — FORCEP TIP ISOCOOL 8.5L 3.5WL 1MM

## (undated) DEVICE — SUTURE MONOSOF 2-0 BLACK 1X18 C-15

## (undated) DEVICE — GELFOAM HEMOSTATIC SPONGE 12 X 7

## (undated) DEVICE — GLOVE PROTEXIS PI ORTHO 7.5

## (undated) DEVICE — SPONGE NEUROSURGICAL W0.5XL1IN WHITE COTTON HIGH QUALITY FIB

## (undated) DEVICE — RESERVOIR DRAIN 100ML

## (undated) DEVICE — STAPLER SKIN

## (undated) DEVICE — *T* 3.0MM MATCH HEAD PRECISION NEURO BUR

## (undated) DEVICE — ***USE 121496***PACK SPINE LUMBAR RMH

## (undated) DEVICE — ***USE 138720*** SUTURE VICRYL 1 J468H CP-1 27IN VIOLET

## (undated) DEVICE — CORD BI-POLAR DISP STERILE

## (undated) DEVICE — ***USE 138470*** SUTURE ETHILON 3-0 1673BH

## (undated) DEVICE — TIP BOVIE BLADE COATED INSULATED 2.75IN

## (undated) DEVICE — GAUZE VASELINE XEROFORM 4X3Y 8884432000

## (undated) DEVICE — PITCHER GRADUATED 32OZ

## (undated) DEVICE — BANDAGE CONFORM 3IN STERILE

## (undated) DEVICE — SOLN IRRIG .9%SOD 1000ML

## (undated) DEVICE — MASTISOL LIQUID ADHESIVE

## (undated) DEVICE — TOWEL DISPOSABLE OR

## (undated) DEVICE — NEEDLE  HYPODERMIC NEEDLE-PRO EDGE SAFETY DEVICE 22G X 1 1/

## (undated) DEVICE — DRESSING SPONGE ALL GAUZE 4X4 STER 10PK

## (undated) DEVICE — LABEL MEDICATION NEUO ORTHO

## (undated) DEVICE — PACK OR TOWEL

## (undated) DEVICE — SYRINGE 10CC CONTROL LL

## (undated) DEVICE — SHEET DRAPE 3/4 REVERSEFOLD 53X77

## (undated) DEVICE — SUTURE VICRYL 2-0  J762D CR CP-2 18IN

## (undated) DEVICE — NEEDLE HYPO 23G 1.5 IN

## (undated) DEVICE — GOWN SIRUS POLYRNF BRTHSLV XL 30/CS

## (undated) DEVICE — SYRINGE DISP LUER-LOK 50 CC

## (undated) DEVICE — DRAPE C-ARM X-RAY EQUIPMENT IMAGE

## (undated) DEVICE — GLOVE SZ 7.5 LINER PROTEXIS PI BL